# Patient Record
Sex: FEMALE | Race: WHITE | Employment: UNEMPLOYED | URBAN - METROPOLITAN AREA
[De-identification: names, ages, dates, MRNs, and addresses within clinical notes are randomized per-mention and may not be internally consistent; named-entity substitution may affect disease eponyms.]

---
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
## 2019-02-22 DIAGNOSIS — F41.1 GAD (GENERALIZED ANXIETY DISORDER): ICD-10-CM

## 2019-02-22 DIAGNOSIS — F32.1 MODERATE MAJOR DEPRESSION (H): ICD-10-CM

## 2019-02-22 RX ORDER — FLUOXETINE 10 MG/1
20 CAPSULE ORAL DAILY
Qty: 60 CAPSULE | Refills: 3 | Status: SHIPPED | OUTPATIENT
Start: 2019-02-22 | End: 2019-02-25

## 2019-02-22 NOTE — TELEPHONE ENCOUNTER
**This refill requires provider completion and is not appropriate for RN review per RN refill guidelines.**  PH-Q9 needs to be less than 5 to approve medication on RN Refill protocol pt's score is 9.  Angela Mcconnell RN

## 2019-02-22 NOTE — TELEPHONE ENCOUNTER
"Requested Prescriptions   Pending Prescriptions Disp Refills     FLUoxetine (PROZAC) 10 MG capsule 60 capsule 3     Sig: Take 2 capsules (20 mg) by mouth daily Start by taking 1 capsule 10 mg) daily for 1 week, then increase to 2 capsules (20 mg) daily    SSRIs Protocol Failed - 2/22/2019  8:42 AM       Failed - PHQ-9 score less than 5 in past 6 months    Please review last PHQ-9 score.          Passed - Medication is active on med list       Passed - Patient is age 18 or older       Passed - No active pregnancy on record       Passed - No positive pregnancy test in last 12 months       Passed - Recent (6 mo) or future (30 days) visit within the authorizing provider's specialty    Patient had office visit in the last 6 months or has a visit in the next 30 days with authorizing provider or within the authorizing provider's specialty.  See \"Patient Info\" tab in inbasket, or \"Choose Columns\" in Meds & Orders section of the refill encounter.            Last Written Prescription Date:  10/9/18  Last Fill Quantity: 60,  # refills: 3   Last office visit: 10/9/2018 with prescribing provider:  Mike   Future Office Visit:   Next 5 appointments (look out 90 days)    Feb 25, 2019  2:40 PM CST  SHORT with Ferny Vásquez MD  [facility] ([facility] [address] [place] [address] [phone]           "

## 2019-02-24 NOTE — PROGRESS NOTES
SUBJECTIVE:   Loni Neumann is a 18 year old female who presents to clinic today for the following health issues:    Loni was started on fluoxetine in September and reported improvement in October.  She has been following with [facility] for therapy.     Depression and Anxiety Follow-Up- would like to discuss increasing dose of Fluoxetine      Status since last visit: Worsened anxiety is worsening     Other associated symptoms:None    Complicating factors:     Significant life event: No- no particular stressors    Current substance abuse: Cannabis    PHQ 10/4/2018 10/9/2018 11/5/2018   PHQ-9 Total Score 15 15 9   Q9: Suicide Ideation Several days Several days Not at all     ZITA-7 SCORE 10/4/2018 10/9/2018 11/5/2018   Total Score 9 10 5     PHQ-9  English  PHQ-9   Any Language  ZITA-7  Suicide Assessment Five-step Evaluation and Treatment (SAFE-T)    Amount of exercise or physical activity: None    Problems taking medications regularly: No    Medication side effects: none    Diet: regular (no restrictions)      Problem list and histories reviewed & adjusted, as indicated.  Additional history: as documented    Patient Active Problem List   Diagnosis     Cannabis dependence (H)     Moderate major depression (H)     ZITA (generalized anxiety disorder)     Encounter for initial prescription of contraceptive pills     History reviewed. No pertinent surgical history.    Social History     Tobacco Use     Smoking status: Passive Smoke Exposure - Never Smoker     Smokeless tobacco: Never Used     Tobacco comment: car and outside   Substance Use Topics     Alcohol use: No     Alcohol/week: 0.0 oz     Family History   Problem Relation Age of Onset     Unknown/Adopted Father      Depression Father      Anxiety Disorder Father      Anxiety Disorder Brother      Attention Deficit Disorder Brother          Current Outpatient Medications   Medication Sig Dispense Refill     FLUoxetine (PROZAC) 40 MG capsule Take 1 capsule (40  "mg) by mouth daily 30 capsule 3     hydrocortisone (WESTCORT) 0.2 % cream Apply topically 2 times daily Apply to affected area 45 g 2     norgestrel-ethinyl estradiol (LO/OVRAL) 0.3-30 MG-MCG per tablet Take 1 tablet by mouth daily 84 tablet 3     Allergies   Allergen Reactions     Penicillins Hives       Reviewed and updated as needed this visit by clinical staff       Reviewed and updated as needed this visit by Provider           OBJECTIVE:     /64 (BP Location: Right arm, Patient Position: Chair, Cuff Size: Adult Regular)   Pulse 104   Temp 98.4  F (36.9  C) (Tympanic)   Resp 16   Ht 1.556 m (5' 1.25\")   Wt 48.6 kg (107 lb 3.2 oz)   LMP 02/02/2019 (Exact Date)   SpO2 98%   Breastfeeding? No   BMI 20.09 kg/m    Body mass index is 20.09 kg/m .  GENERAL: healthy, alert and no distress  PSYCH: mentation appears normal, affect normal/bright      ASSESSMENT/PLAN:       1. Moderate major depression (H)  2. ZITA (generalized anxiety disorder)    Loni reports worsening anxiety symptoms, no clear provoking factors for this.  She is tolerating the fluoxetine well, so we'll increase this from 20mg to 40mg.  She is going to follow-up with therapy.  Follow-up with me or PCP in 2 months for reassessment or earlier if needed.      - FLUoxetine (PROZAC) 40 MG capsule; Take 1 capsule (40 mg) by mouth daily  Dispense: 30 capsule; Refill: 3      Ferny Vásquez MD  [facility]   "

## 2019-02-25 ENCOUNTER — OFFICE VISIT (OUTPATIENT)
Dept: FAMILY MEDICINE | Facility: CLINIC | Age: 19
End: 2019-02-25
Payer: COMMERCIAL

## 2019-02-25 VITALS
HEIGHT: 61 IN | HEART RATE: 104 BPM | BODY MASS INDEX: 20.24 KG/M2 | DIASTOLIC BLOOD PRESSURE: 64 MMHG | TEMPERATURE: 98.4 F | OXYGEN SATURATION: 98 % | SYSTOLIC BLOOD PRESSURE: 104 MMHG | WEIGHT: 107.2 LBS | RESPIRATION RATE: 16 BRPM

## 2019-02-25 DIAGNOSIS — F41.1 GAD (GENERALIZED ANXIETY DISORDER): ICD-10-CM

## 2019-02-25 DIAGNOSIS — F32.1 MODERATE MAJOR DEPRESSION (H): Primary | ICD-10-CM

## 2019-02-25 PROCEDURE — 99213 OFFICE O/P EST LOW 20 MIN: CPT | Performed by: INTERNAL MEDICINE

## 2019-02-25 RX ORDER — FLUOXETINE 40 MG/1
40 CAPSULE ORAL DAILY
Qty: 30 CAPSULE | Refills: 3 | Status: SHIPPED | OUTPATIENT
Start: 2019-02-25 | End: 2019-06-22

## 2019-02-25 ASSESSMENT — ANXIETY QUESTIONNAIRES
3. WORRYING TOO MUCH ABOUT DIFFERENT THINGS: SEVERAL DAYS
GAD7 TOTAL SCORE: 10
2. NOT BEING ABLE TO STOP OR CONTROL WORRYING: SEVERAL DAYS
7. FEELING AFRAID AS IF SOMETHING AWFUL MIGHT HAPPEN: SEVERAL DAYS
5. BEING SO RESTLESS THAT IT IS HARD TO SIT STILL: MORE THAN HALF THE DAYS
IF YOU CHECKED OFF ANY PROBLEMS ON THIS QUESTIONNAIRE, HOW DIFFICULT HAVE THESE PROBLEMS MADE IT FOR YOU TO DO YOUR WORK, TAKE CARE OF THINGS AT HOME, OR GET ALONG WITH OTHER PEOPLE: VERY DIFFICULT
6. BECOMING EASILY ANNOYED OR IRRITABLE: MORE THAN HALF THE DAYS
1. FEELING NERVOUS, ANXIOUS, OR ON EDGE: SEVERAL DAYS

## 2019-02-25 ASSESSMENT — PATIENT HEALTH QUESTIONNAIRE - PHQ9
5. POOR APPETITE OR OVEREATING: MORE THAN HALF THE DAYS
SUM OF ALL RESPONSES TO PHQ QUESTIONS 1-9: 4

## 2019-02-25 ASSESSMENT — MIFFLIN-ST. JEOR: SCORE: 1202.6

## 2019-02-25 NOTE — NURSING NOTE
"Initial /64 (BP Location: Right arm, Patient Position: Chair, Cuff Size: Adult Regular)   Pulse 104   Temp 98.4  F (36.9  C) (Tympanic)   Resp 16   Ht 1.556 m (5' 1.25\")   Wt 48.6 kg (107 lb 3.2 oz)   SpO2 98%   BMI 20.09 kg/m   Estimated body mass index is 20.09 kg/m  as calculated from the following:    Height as of this encounter: 1.556 m (5' 1.25\").    Weight as of this encounter: 48.6 kg (107 lb 3.2 oz). .    Tammy Tejeda CMA ([facility]) 2/25/2019 2:49 PM       "

## 2019-02-26 ASSESSMENT — ANXIETY QUESTIONNAIRES: GAD7 TOTAL SCORE: 10

## 2019-06-22 DIAGNOSIS — F32.1 MODERATE MAJOR DEPRESSION (H): ICD-10-CM

## 2019-06-22 DIAGNOSIS — F41.1 GAD (GENERALIZED ANXIETY DISORDER): ICD-10-CM

## 2019-06-24 RX ORDER — FLUOXETINE 40 MG/1
40 CAPSULE ORAL DAILY
Qty: 30 CAPSULE | Refills: 0 | Status: SHIPPED | OUTPATIENT
Start: 2019-06-24 | End: 2019-07-26

## 2019-06-24 NOTE — TELEPHONE ENCOUNTER
"Requested Prescriptions   Pending Prescriptions Disp Refills     FLUoxetine (PROZAC) 40 MG capsule [Pharmacy Med Name: FLUOXETINE HCL 40 MG CAPSULE] 30 capsule 3     Sig: Take 1 capsule (40 mg) by mouth daily   Last Written Prescription Date:  2/25/19  Last Fill Quantity: 30 cap,  # refills: 3   Last office visit: 2/25/2019 with prescribing provider:  EVANGELIST Vásquez   Future Office Visit:        SSRIs Protocol Passed - 6/22/2019  8:00 AM        Passed - PHQ-9 score less than 5 in past 6 months     Please review last PHQ-9 score.           Passed - Medication is active on med list        Passed - Patient is age 18 or older        Passed - No active pregnancy on record        Passed - No positive pregnancy test in last 12 months        Passed - Recent (6 mo) or future (30 days) visit within the authorizing provider's specialty     Patient had office visit in the last 6 months or has a visit in the next 30 days with authorizing provider or within the authorizing provider's specialty.  See \"Patient Info\" tab in inbasket, or \"Choose Columns\" in Meds & Orders section of the refill encounter.              "

## 2019-06-24 NOTE — TELEPHONE ENCOUNTER
Medication is being filled for 1 time refill only due to:  :  Pt was due to come in around 4/25/19 for Routine Visit.    PHQ-9 SCORE 10/9/2018 11/5/2018 2/25/2019   PHQ-9 Total Score 15 9 4     ZITA-7 SCORE 10/9/2018 11/5/2018 2/25/2019   Total Score 10 5 10     Laura KULKARNI RN       Was the patient seen in the last year in this department? Yes     Does patient have an active prescription for medications requested? No     Received Request Via: Pharmacy

## 2019-07-26 DIAGNOSIS — F32.1 MODERATE MAJOR DEPRESSION (H): ICD-10-CM

## 2019-07-26 DIAGNOSIS — F41.1 GAD (GENERALIZED ANXIETY DISORDER): ICD-10-CM

## 2019-07-29 RX ORDER — FLUOXETINE 40 MG/1
40 CAPSULE ORAL DAILY
Qty: 30 CAPSULE | Refills: 0 | Status: SHIPPED | OUTPATIENT
Start: 2019-07-29 | End: 2019-08-26

## 2019-07-29 NOTE — TELEPHONE ENCOUNTER
"Requested Prescriptions   Pending Prescriptions Disp Refills     FLUoxetine (PROZAC) 40 MG capsule [Pharmacy Med Name: FLUOXETINE HCL 40 MG CAPSULE] 30 capsule 0     Sig: Take 1 capsule (40 mg) by mouth daily       SSRIs Protocol Passed - 7/26/2019  2:55 PM        Passed - PHQ-9 score less than 5 in past 6 months     Please review last PHQ-9 score.     PHQ-9 SCORE 10/9/2018 11/5/2018 2/25/2019   PHQ-9 Total Score 15 9 4     ZITA-7 SCORE 10/9/2018 11/5/2018 2/25/2019   Total Score 10 5 10                 Passed - Medication is active on med list        Passed - Patient is age 18 or older        Passed - No active pregnancy on record        Passed - No positive pregnancy test in last 12 months        Passed - Recent (6 mo) or future (30 days) visit within the authorizing provider's specialty     Patient had office visit in the last 6 months or has a visit in the next 30 days with authorizing provider or within the authorizing provider's specialty.  See \"Patient Info\" tab in inbasket, or \"Choose Columns\" in Meds & Orders section of the refill encounter.      Last Written Prescription Date:  6/26/19  Last Fill Quantity: 30,  # refills: 0   Last office visit: 2/25/2019 with prescribing provider:     Future Office Visit:                "

## 2019-07-29 NOTE — TELEPHONE ENCOUNTER
S:  Refill request for fluoxetine    B:  LOV 2/25/19  Fluoxetine last written 6/24/19 for 30 day supply    A:  Passes [facility] refill protocol    R:  Filled per [facility] refill protocol    No further action necessary.  Encounter closed.    Iam Mcneill RN

## 2019-08-12 DIAGNOSIS — Z30.011 ENCOUNTER FOR INITIAL PRESCRIPTION OF CONTRACEPTIVE PILLS: ICD-10-CM

## 2019-08-12 NOTE — TELEPHONE ENCOUNTER
"Requested Prescriptions   Pending Prescriptions Disp Refills     ELINEST 0.3-30 MG-MCG tablet [Pharmacy Med Name: ELINEST 0.3-0.03MG TABLET] 84 tablet 3     Sig: TAKE ONE TABLET BY MOUTH EVERY DAY   Last Written Prescription Date:  6/21/18  Last Fill Quantity: 84 tab,  # refills: 3   Last office visit: 2/25/2019 with prescribing provider:  EVANGELIST Vásquez   Future Office Visit:        Contraceptives Protocol Passed - 8/12/2019  1:31 PM        Passed - Patient is not a current smoker if age is 35 or older        Passed - Recent (12 mo) or future (30 days) visit within the authorizing provider's specialty     Patient had office visit in the last 12 months or has a visit in the next 30 days with authorizing provider or within the authorizing provider's specialty.  See \"Patient Info\" tab in inbasket, or \"Choose Columns\" in Meds & Orders section of the refill encounter.              Passed - Medication is active on med list        Passed - No active pregnancy on record        Passed - No positive pregnancy test in past 12 months          "

## 2019-08-14 RX ORDER — NORGESTREL AND ETHINYL ESTRADIOL 0.3-0.03MG
KIT ORAL
Qty: 84 TABLET | Refills: 1 | Status: SHIPPED | OUTPATIENT
Start: 2019-08-14 | End: 2019-08-26

## 2019-08-26 ENCOUNTER — OFFICE VISIT (OUTPATIENT)
Dept: FAMILY MEDICINE | Facility: CLINIC | Age: 19
End: 2019-08-26
Payer: COMMERCIAL

## 2019-08-26 VITALS
HEART RATE: 77 BPM | TEMPERATURE: 98.8 F | BODY MASS INDEX: 23.3 KG/M2 | WEIGHT: 123.4 LBS | SYSTOLIC BLOOD PRESSURE: 100 MMHG | HEIGHT: 61 IN | OXYGEN SATURATION: 98 % | DIASTOLIC BLOOD PRESSURE: 62 MMHG | RESPIRATION RATE: 16 BRPM

## 2019-08-26 DIAGNOSIS — F12.20 CANNABIS DEPENDENCE (H): ICD-10-CM

## 2019-08-26 DIAGNOSIS — Z30.011 ENCOUNTER FOR INITIAL PRESCRIPTION OF CONTRACEPTIVE PILLS: ICD-10-CM

## 2019-08-26 DIAGNOSIS — F32.1 MODERATE MAJOR DEPRESSION (H): ICD-10-CM

## 2019-08-26 DIAGNOSIS — Z11.3 SCREEN FOR STD (SEXUALLY TRANSMITTED DISEASE): Primary | ICD-10-CM

## 2019-08-26 DIAGNOSIS — F41.1 GAD (GENERALIZED ANXIETY DISORDER): ICD-10-CM

## 2019-08-26 PROCEDURE — 87591 N.GONORRHOEAE DNA AMP PROB: CPT | Performed by: NURSE PRACTITIONER

## 2019-08-26 PROCEDURE — 87491 CHLMYD TRACH DNA AMP PROBE: CPT | Performed by: NURSE PRACTITIONER

## 2019-08-26 PROCEDURE — 99213 OFFICE O/P EST LOW 20 MIN: CPT | Performed by: NURSE PRACTITIONER

## 2019-08-26 RX ORDER — FLUOXETINE 40 MG/1
40 CAPSULE ORAL DAILY
Qty: 90 CAPSULE | Refills: 3 | Status: SHIPPED | OUTPATIENT
Start: 2019-08-26 | End: 2019-11-11 | Stop reason: DRUGHIGH

## 2019-08-26 ASSESSMENT — PATIENT HEALTH QUESTIONNAIRE - PHQ9
5. POOR APPETITE OR OVEREATING: NOT AT ALL
SUM OF ALL RESPONSES TO PHQ QUESTIONS 1-9: 2

## 2019-08-26 ASSESSMENT — MIFFLIN-ST. JEOR: SCORE: 1267.12

## 2019-08-26 ASSESSMENT — ANXIETY QUESTIONNAIRES
3. WORRYING TOO MUCH ABOUT DIFFERENT THINGS: NOT AT ALL
7. FEELING AFRAID AS IF SOMETHING AWFUL MIGHT HAPPEN: NOT AT ALL
GAD7 TOTAL SCORE: 3
1. FEELING NERVOUS, ANXIOUS, OR ON EDGE: SEVERAL DAYS
5. BEING SO RESTLESS THAT IT IS HARD TO SIT STILL: SEVERAL DAYS
IF YOU CHECKED OFF ANY PROBLEMS ON THIS QUESTIONNAIRE, HOW DIFFICULT HAVE THESE PROBLEMS MADE IT FOR YOU TO DO YOUR WORK, TAKE CARE OF THINGS AT HOME, OR GET ALONG WITH OTHER PEOPLE: SOMEWHAT DIFFICULT
6. BECOMING EASILY ANNOYED OR IRRITABLE: SEVERAL DAYS
2. NOT BEING ABLE TO STOP OR CONTROL WORRYING: NOT AT ALL

## 2019-08-26 NOTE — PATIENT INSTRUCTIONS
PREVENTIVE HEALTH RECOMMENDATIONS    Colonoscopy every 10 years after age 50 unless polyps or  family history then  earlier and more often.     Pap smears for 3 consecutive years, if normal can go every 3 years after the age of 30 depending on risk factors but  continue pelvic exams yearly with check ups.    Mammogram baseline at 40 with frequency after that dependent on findings and risks but yearly after 50     Bone density testing (DEXA scan) every 3 years after age 65    Exercise 30 min daily most days of the week.    Keep weight below BMI of 30.  Ideal weight < 26 BMI.  Your BMI today was Body mass index is 23.32 kg/m ..    Calcium citrate 1200 mg a day plus 800 IU of Vitamin D  Daily.    Avoid more than one alcoholic beverage per occasion    Cholesterol screening every 5 years unless on medication then minimally every year.    Keep blood pressure below 140/90.  Your blood pressure today was   BP Readings from Last 6 Encounters:   08/26/19 100/62   02/25/19 104/64   10/09/18 94/70   09/13/18 110/80   08/22/18 118/62   06/21/18 106/67       Kegel exercises to prevent pelvic floor relaxation and leaking of urine    Immunizations:  Tetanus  every 10 years, Shingles vaccine (zostavax if desired after 60), Pneumonia vaccine after age 65,  yearly influenza vaccination if desired or history of respiratory problems.    PRIMITIVO Hackett

## 2019-08-26 NOTE — PROGRESS NOTES
Subjective     Loin Neumann is a 19 year old female who presents to clinic today for the following health issues:    HPI   Depression and Anxiety Follow-Up    How are you doing with your depression since your last visit? Improved     How are you doing with your anxiety since your last visit?  No change    Are you having other symptoms that might be associated with depression or anxiety? No    Have you had a significant life event? Grief or Loss     Do you have any concerns with your use of alcohol or other drugs? No    Social History     Tobacco Use     Smoking status: Passive Smoke Exposure - Never Smoker     Smokeless tobacco: Never Used     Tobacco comment: car and outside   Substance Use Topics     Alcohol use: No     Alcohol/week: 0.0 oz     Drug use: Yes     Types: Marijuana     Comment: 2-3 times per month     PHQ 11/5/2018 2/25/2019 8/26/2019   PHQ-9 Total Score 9 4 2   Q9: Thoughts of better off dead/self-harm past 2 weeks Not at all Not at all Not at all     ZITA-7 SCORE 11/5/2018 2/25/2019 8/26/2019   Total Score 5 10 3     No flowsheet data found.  No flowsheet data found.  In the past two weeks have you had thoughts of suicide or self-harm?  No.    Do you have concerns about your personal safety or the safety of others?   No    Suicide Assessment Five-step Evaluation and Treatment (SAFE-T)      How many servings of fruits and vegetables do you eat daily?  0-1    On average, how many sweetened beverages do you drink each day (soda, juice, sweet tea, etc)?   4    How many days per week do you miss taking your medication? 0        Also taking oral contraceptive pills - taking daily.  LMP 8/7/2019 - regular period.  No side effects or concerns taking medications.        Patient Active Problem List   Diagnosis     Cannabis dependence (H)     Moderate major depression (H)     ZITA (generalized anxiety disorder)     Encounter for initial prescription of contraceptive pills     History reviewed. No pertinent  "surgical history.    Social History     Tobacco Use     Smoking status: Passive Smoke Exposure - Never Smoker     Smokeless tobacco: Never Used     Tobacco comment: car and outside   Substance Use Topics     Alcohol use: No     Alcohol/week: 0.0 oz     Family History   Problem Relation Age of Onset     Unknown/Adopted Father      Depression Father      Anxiety Disorder Father      Anxiety Disorder Brother      Attention Deficit Disorder Brother          Current Outpatient Medications   Medication Sig Dispense Refill     FLUoxetine (PROZAC) 40 MG capsule Take 1 capsule (40 mg) by mouth daily 90 capsule 3     hydrocortisone (WESTCORT) 0.2 % cream Apply topically 2 times daily Apply to affected area 45 g 2     norgestrel-ethinyl estradiol (ELINEST) 0.3-30 MG-MCG tablet Take 1 tablet by mouth daily 84 tablet 3     Allergies   Allergen Reactions     Penicillins Hives     No lab results found.   BP Readings from Last 3 Encounters:   08/26/19 100/62   02/25/19 104/64   10/09/18 94/70    Wt Readings from Last 3 Encounters:   08/26/19 56 kg (123 lb 6.4 oz)   02/25/19 48.6 kg (107 lb 3.2 oz) (11 %)*   10/09/18 45.4 kg (100 lb 1.6 oz) (4 %)*     * Growth percentiles are based on CDC (Girls, 2-20 Years) data.                      Reviewed and updated as needed this visit by Provider  Tobacco  Allergies  Meds  Problems  Med Hx  Soc Hx        Review of Systems   ROS COMP: Constitutional, HEENT, cardiovascular, pulmonary, GI, , musculoskeletal, neuro, skin, endocrine and psych systems are negative, except as otherwise noted.      Objective    /62   Pulse 77   Temp 98.8  F (37.1  C) (Tympanic)   Resp 16   Ht 1.549 m (5' 1\")   Wt 56 kg (123 lb 6.4 oz)   LMP 08/07/2019 (Exact Date)   SpO2 98%   BMI 23.32 kg/m    Body mass index is 23.32 kg/m .  Physical Exam   GENERAL: healthy, alert and no distress  NECK: no adenopathy, no asymmetry, masses, or scars and thyroid normal to palpation  RESP: lungs clear to " auscultation - no rales, rhonchi or wheezes  CV: regular rate and rhythm, normal S1 S2, no S3 or S4, no murmur, click or rub, no peripheral edema and peripheral pulses strong  ABDOMEN: soft, nontender, no hepatosplenomegaly, no masses and bowel sounds normal  MS: no gross musculoskeletal defects noted, no edema  PSYCH: mentation appears normal, affect normal/bright    Diagnostic Test Results:  Labs reviewed in Epic  Gonorrhea/chlamydia testing        Assessment & Plan     1. Encounter for initial prescription of contraceptive pills   The risks, benefits and treatment options of prescribed medications or other treatments have been discussed with the patient. The patient verbalized their understanding and should call or follow up if no improvement or if they develop further problems.    - norgestrel-ethinyl estradiol (ELINEST) 0.3-30 MG-MCG tablet; Take 1 tablet by mouth daily  Dispense: 84 tablet; Refill: 3    2. Moderate major depression (H)   stable - doing well.    - FLUoxetine (PROZAC) 40 MG capsule; Take 1 capsule (40 mg) by mouth daily  Dispense: 90 capsule; Refill: 3    3. ZITA (generalized anxiety disorder)     - FLUoxetine (PROZAC) 40 MG capsule; Take 1 capsule (40 mg) by mouth daily  Dispense: 90 capsule; Refill: 3    4. Screen for STD (sexually transmitted disease)     - NEISSERIA GONORRHOEA PCR  - CHLAMYDIA TRACHOMATIS PCR       Patient Instructions     PREVENTIVE HEALTH RECOMMENDATIONS    Colonoscopy every 10 years after age 50 unless polyps or  family history then  earlier and more often.     Pap smears for 3 consecutive years, if normal can go every 3 years after the age of 30 depending on risk factors but  continue pelvic exams yearly with check ups.    Mammogram baseline at 40 with frequency after that dependent on findings and risks but yearly after 50     Bone density testing (DEXA scan) every 3 years after age 65    Exercise 30 min daily most days of the week.    Keep weight below BMI of 30.  Ideal  weight < 26 BMI.  Your BMI today was Body mass index is 23.32 kg/m ..    Calcium citrate 1200 mg a day plus 800 IU of Vitamin D  Daily.    Avoid more than one alcoholic beverage per occasion    Cholesterol screening every 5 years unless on medication then minimally every year.    Keep blood pressure below 140/90.  Your blood pressure today was   BP Readings from Last 6 Encounters:   08/26/19 100/62   02/25/19 104/64   10/09/18 94/70   09/13/18 110/80   08/22/18 118/62   06/21/18 106/67       Kegel exercises to prevent pelvic floor relaxation and leaking of urine    Immunizations:  Tetanus  every 10 years, Shingles vaccine (zostavax if desired after 60), Pneumonia vaccine after age 65,  yearly influenza vaccination if desired or history of respiratory problems.    PRIMITIVO Hackett          Return in about 1 year (around 8/26/2020) for Physical Exam.    Kavya Mckeon NP  [facility] [place]

## 2019-08-27 ASSESSMENT — ANXIETY QUESTIONNAIRES: GAD7 TOTAL SCORE: 3

## 2019-11-03 ENCOUNTER — HEALTH MAINTENANCE LETTER (OUTPATIENT)
Age: 19
End: 2019-11-03

## 2019-11-11 ENCOUNTER — OFFICE VISIT (OUTPATIENT)
Dept: FAMILY MEDICINE | Facility: CLINIC | Age: 19
End: 2019-11-11
Payer: COMMERCIAL

## 2019-11-11 VITALS
OXYGEN SATURATION: 98 % | WEIGHT: 130.1 LBS | TEMPERATURE: 97.7 F | HEART RATE: 93 BPM | BODY MASS INDEX: 24.56 KG/M2 | HEIGHT: 61 IN | DIASTOLIC BLOOD PRESSURE: 70 MMHG | RESPIRATION RATE: 14 BRPM | SYSTOLIC BLOOD PRESSURE: 106 MMHG

## 2019-11-11 DIAGNOSIS — F32.1 MODERATE MAJOR DEPRESSION (H): ICD-10-CM

## 2019-11-11 DIAGNOSIS — F41.1 GAD (GENERALIZED ANXIETY DISORDER): ICD-10-CM

## 2019-11-11 PROCEDURE — 99214 OFFICE O/P EST MOD 30 MIN: CPT | Performed by: NURSE PRACTITIONER

## 2019-11-11 RX ORDER — FLUOXETINE 40 MG/1
40 CAPSULE ORAL DAILY
Qty: 90 CAPSULE | Refills: 3 | Status: CANCELLED | OUTPATIENT
Start: 2019-11-11

## 2019-11-11 ASSESSMENT — ANXIETY QUESTIONNAIRES
3. WORRYING TOO MUCH ABOUT DIFFERENT THINGS: SEVERAL DAYS
5. BEING SO RESTLESS THAT IT IS HARD TO SIT STILL: MORE THAN HALF THE DAYS
GAD7 TOTAL SCORE: 4
7. FEELING AFRAID AS IF SOMETHING AWFUL MIGHT HAPPEN: NOT AT ALL
1. FEELING NERVOUS, ANXIOUS, OR ON EDGE: SEVERAL DAYS
6. BECOMING EASILY ANNOYED OR IRRITABLE: NOT AT ALL
IF YOU CHECKED OFF ANY PROBLEMS ON THIS QUESTIONNAIRE, HOW DIFFICULT HAVE THESE PROBLEMS MADE IT FOR YOU TO DO YOUR WORK, TAKE CARE OF THINGS AT HOME, OR GET ALONG WITH OTHER PEOPLE: SOMEWHAT DIFFICULT
2. NOT BEING ABLE TO STOP OR CONTROL WORRYING: NOT AT ALL

## 2019-11-11 ASSESSMENT — PATIENT HEALTH QUESTIONNAIRE - PHQ9
5. POOR APPETITE OR OVEREATING: NOT AT ALL
SUM OF ALL RESPONSES TO PHQ QUESTIONS 1-9: 12

## 2019-11-11 ASSESSMENT — MIFFLIN-ST. JEOR: SCORE: 1297.51

## 2019-11-11 NOTE — NURSING NOTE
"Initial /70 (BP Location: Right arm, Patient Position: Sitting, Cuff Size: Adult Regular)   Pulse 93   Temp 97.7  F (36.5  C) (Tympanic)   Resp 14   Ht 1.549 m (5' 1\")   Wt 59 kg (130 lb 1.6 oz)   SpO2 98%   BMI 24.58 kg/m   Estimated body mass index is 24.58 kg/m  as calculated from the following:    Height as of this encounter: 1.549 m (5' 1\").    Weight as of this encounter: 59 kg (130 lb 1.6 oz). .    Brittany Ferguson on 11/11/2019 at 2:40 PM    "

## 2019-11-11 NOTE — PATIENT INSTRUCTIONS
Increase Fluoxetine to 60 mg - check in at 1 month to make sure symptoms have improved.  If they have then we will check in again at 3-4 months and reassess if we can drop back down to 40 mg again.    Follow-up with [facility] in the next 1-2 weeks and restart counseling for grief and anxiety/depressive symptoms.    PRIMITIVO Hackett

## 2019-11-11 NOTE — PROGRESS NOTES
Subjective     Loni Neumann is a 19 year old female who presents to clinic today for the following health issues:    HPI   Depression and Anxiety Follow-Up    How are you doing with your depression since your last visit? Worsened - pt states it may be due to weather changes.     How are you doing with your anxiety since your last visit?  Worsened.     Are you having other symptoms that might be associated with depression or anxiety? No    Have you had a significant life event? No     Do you have any concerns with your use of alcohol or other drugs? No     Patient would like to discuss her current dose of medication.     Social History     Tobacco Use     Smoking status: Current Some Day Smoker     Packs/day: 0.00     Smokeless tobacco: Never Used     Tobacco comment: E-cig   Substance Use Topics     Alcohol use: No     Alcohol/week: 0.0 standard drinks     Drug use: Yes     Types: Marijuana     Comment: 2-3 times per month     PHQ 2/25/2019 8/26/2019 11/11/2019   PHQ-9 Total Score 4 2 12   Q9: Thoughts of better off dead/self-harm past 2 weeks Not at all Not at all Not at all     ZITA-7 SCORE 2/25/2019 8/26/2019 11/11/2019   Total Score 10 3 4     Last PHQ-9 11/11/2019   1.  Little interest or pleasure in doing things 2   2.  Feeling down, depressed, or hopeless 1   3.  Trouble falling or staying asleep, or sleeping too much 2   4.  Feeling tired or having little energy 3   5.  Poor appetite or overeating 0   6.  Feeling bad about yourself 1   7.  Trouble concentrating 1   8.  Moving slowly or restless 2   Q9: Thoughts of better off dead/self-harm past 2 weeks 0   PHQ-9 Total Score 12   Difficulty at work, home, or with people Very difficult     ZITA-7  11/11/2019   1. Feeling nervous, anxious, or on edge 1   2. Not being able to stop or control worrying 0   3. Worrying too much about different things 1   4. Trouble relaxing 0   5. Being so restless that it is hard to sit still 2   6. Becoming easily annoyed or  irritable 0   7. Feeling afraid, as if something awful might happen 0   ZITA-7 Total Score 4   If you checked any problems, how difficult have they made it for you to do your work, take care of things at home, or get along with other people? Somewhat difficult     In the past two weeks have you had thoughts of suicide or self-harm?  No.    Do you have concerns about your personal safety or the safety of others?   No    Suicide Assessment Five-step Evaluation and Treatment (SAFE-T)      How many servings of fruits and vegetables do you eat daily?  2-3    On average, how many sweetened beverages do you drink each day (soda, juice, sweet tea, etc)?   4 - Juice.     How many days per week do you miss taking your medication? 0      Patient Active Problem List   Diagnosis     Cannabis dependence (H)     Moderate major depression (H)     ZITA (generalized anxiety disorder)     Encounter for initial prescription of contraceptive pills     History reviewed. No pertinent surgical history.    Social History     Tobacco Use     Smoking status: Current Some Day Smoker     Packs/day: 0.00     Smokeless tobacco: Never Used     Tobacco comment: E-cig   Substance Use Topics     Alcohol use: No     Alcohol/week: 0.0 standard drinks     Family History   Problem Relation Age of Onset     Unknown/Adopted Father      Depression Father      Anxiety Disorder Father      Anxiety Disorder Brother      Attention Deficit Disorder Brother          Current Outpatient Medications   Medication Sig Dispense Refill     FLUoxetine (PROZAC) 20 MG capsule Take 3 capsules (60 mg) by mouth daily 90 capsule 6     hydrocortisone (WESTCORT) 0.2 % cream Apply topically 2 times daily Apply to affected area 45 g 2     norgestrel-ethinyl estradiol (ELINEST) 0.3-30 MG-MCG tablet Take 1 tablet by mouth daily 84 tablet 3     Allergies   Allergen Reactions     Penicillins Hives     No lab results found.   BP Readings from Last 3 Encounters:   11/11/19 106/70  "  08/26/19 100/62   02/25/19 104/64    Wt Readings from Last 3 Encounters:   11/11/19 59 kg (130 lb 1.6 oz)   08/26/19 56 kg (123 lb 6.4 oz)   02/25/19 48.6 kg (107 lb 3.2 oz) (11 %)*     * Growth percentiles are based on [facility] (Girls, 2-20 Years) data.                    Reviewed and updated as needed this visit by Provider  Tobacco  Med Hx  Soc Hx        Review of Systems   ROS COMP: Constitutional, HEENT, cardiovascular, pulmonary, GI, , musculoskeletal, neuro, skin, endocrine and psych systems are negative, except as otherwise noted.      Objective    /70 (BP Location: Right arm, Patient Position: Sitting, Cuff Size: Adult Regular)   Pulse 93   Temp 97.7  F (36.5  C) (Tympanic)   Resp 14   Ht 1.549 m (5' 1\")   Wt 59 kg (130 lb 1.6 oz)   SpO2 98%   BMI 24.58 kg/m    Body mass index is 24.58 kg/m .  Physical Exam   GENERAL: healthy, alert and no distress  PSYCH: mentation appears normal, affect flat, fatigued and judgement and insight intact    Diagnostic Test Results:  Labs reviewed in Epic        Assessment & Plan     1. Moderate major depression (H)   The risks, benefits and treatment options of prescribed medications or other treatments have been discussed with the patient. The patient verbalized their understanding and should call or follow up if no improvement or if they develop further problems.  Discussed counseling and grief groups.  No SI today.    - FLUoxetine (PROZAC) 20 MG capsule; Take 3 capsules (60 mg) by mouth daily  Dispense: 90 capsule; Refill: 6    2. ZITA (generalized anxiety disorder)     - FLUoxetine (PROZAC) 20 MG capsule; Take 3 capsules (60 mg) by mouth daily  Dispense: 90 capsule; Refill: 6     Tobacco Cessation:   reports that she has been smoking. She has been smoking about 0.00 packs per day. She has never used smokeless tobacco.          Patient Instructions   Increase Fluoxetine to 60 mg - check in at 1 month to make sure symptoms have improved.  If they have then we " will check in again at 3-4 months and reassess if we can drop back down to 40 mg again.    Follow-up with [facility] in the next 1-2 weeks and restart counseling for grief and anxiety/depressive symptoms.    PRIMITIVO Hackett              No follow-ups on file.    Kavya Mckeon NP  [facility]

## 2019-11-12 ASSESSMENT — ANXIETY QUESTIONNAIRES: GAD7 TOTAL SCORE: 4

## 2019-11-14 ENCOUNTER — OFFICE VISIT (OUTPATIENT)
Dept: BEHAVIORAL HEALTH | Facility: CLINIC | Age: 19
End: 2019-11-14
Payer: COMMERCIAL

## 2019-11-14 DIAGNOSIS — F32.1 MODERATE MAJOR DEPRESSION (H): Primary | ICD-10-CM

## 2019-11-14 DIAGNOSIS — F41.1 GAD (GENERALIZED ANXIETY DISORDER): ICD-10-CM

## 2019-11-14 PROCEDURE — 90832 PSYTX W PT 30 MINUTES: CPT | Performed by: SOCIAL WORKER

## 2019-11-21 ENCOUNTER — OFFICE VISIT (OUTPATIENT)
Dept: BEHAVIORAL HEALTH | Facility: CLINIC | Age: 19
End: 2019-11-21
Payer: COMMERCIAL

## 2019-11-21 DIAGNOSIS — F32.1 MODERATE MAJOR DEPRESSION (H): Primary | ICD-10-CM

## 2019-11-21 DIAGNOSIS — F41.1 GAD (GENERALIZED ANXIETY DISORDER): ICD-10-CM

## 2019-11-21 PROCEDURE — 90834 PSYTX W PT 45 MINUTES: CPT | Performed by: SOCIAL WORKER

## 2019-11-21 ASSESSMENT — ANXIETY QUESTIONNAIRES
7. FEELING AFRAID AS IF SOMETHING AWFUL MIGHT HAPPEN: NOT AT ALL
6. BECOMING EASILY ANNOYED OR IRRITABLE: SEVERAL DAYS
2. NOT BEING ABLE TO STOP OR CONTROL WORRYING: SEVERAL DAYS
3. WORRYING TOO MUCH ABOUT DIFFERENT THINGS: NOT AT ALL
IF YOU CHECKED OFF ANY PROBLEMS ON THIS QUESTIONNAIRE, HOW DIFFICULT HAVE THESE PROBLEMS MADE IT FOR YOU TO DO YOUR WORK, TAKE CARE OF THINGS AT HOME, OR GET ALONG WITH OTHER PEOPLE: SOMEWHAT DIFFICULT
GAD7 TOTAL SCORE: 5
5. BEING SO RESTLESS THAT IT IS HARD TO SIT STILL: MORE THAN HALF THE DAYS
1. FEELING NERVOUS, ANXIOUS, OR ON EDGE: SEVERAL DAYS

## 2019-11-21 ASSESSMENT — PATIENT HEALTH QUESTIONNAIRE - PHQ9
SUM OF ALL RESPONSES TO PHQ QUESTIONS 1-9: 9
5. POOR APPETITE OR OVEREATING: NOT AT ALL

## 2019-11-22 ASSESSMENT — ANXIETY QUESTIONNAIRES: GAD7 TOTAL SCORE: 5

## 2019-11-25 NOTE — PROGRESS NOTES
[facility]  November 14, 2019      Behavioral Health Clinician Progress Note    Patient Name: Loni Neumann           Service Type: Individual      Service Location:  in clinic      Session Start Time: 205 PM  session End Time:235 PM      Session Length: 16 - 37      Attendees: Patient    Visit Activities (Refresh list every visit): [facility] [place] Only    Diagnostic Assessment Date: 5/14/2018  Treatment Plan Review Date: 10-  See Flowsheets for today's PHQ-9 and ZITA-7 results  Previous PHQ-9:   PHQ-9 SCORE 8/26/2019 11/11/2019 11/21/2019   PHQ-9 Total Score 2 12 9     Previous ZITA-7:   ZITA-7 SCORE 8/26/2019 11/11/2019 11/21/2019   Total Score 3 4 5       DON LEVEL:  DON Score (Last Two) 5/14/2018   DON Raw Score 27   Activation Score 47.4   DON Level 2       DATA  Extended Session (60+ minutes): No  Interactive Complexity: No  Crisis: No    Treatment Objective(s) Addressed in This Session:  Target Behavior(s): disease management/lifestyle changes Decrease depression and anxiety and Decrease/discontinue cannabis use    Depressed Mood: Increase interest, engagement, and pleasure in doing things  Decrease frequency and intensity of feeling down, depressed, hopeless  Improve quantity and quality of night time sleep / decrease daytime naps  Feel less tired and more energy during the day   Improve diet, appetite, mindful eating, and / or meal planning  Identify negative self-talk and behaviors: challenge core beliefs, myths, and actions  Improve concentration, focus, and mindfulness in daily activities   Feel less fidgety, restless or slow in daily activities / interpersonal interactions  Decrease thoughts that you'd be better off dead or of suicide / self-harm  Anxiety: will experience a reduction in anxiety, will develop more effective coping skills to manage anxiety symptoms, will develop healthy cognitive patterns and beliefs and will increase ability to function adaptively  Functional  Impairment: will effectively address problems that interfere with adaptive functioning  Alcohol / Substance Use: will increase understanding of the effects of substance use  will make healthier choices in regard to substance use  will discuss/consider potential need for formal substance use evaluation, treatment and/or support  continue to make healthy choices regarding substance use and engage in activities / supportive services that promote sobriety    Current Stressors / Issues:  Patient reports depression and anxiety have increased due to recent suicide of best friend since childhood.  Patient reports feeling guilt that she had stopped contact as it was not healthy for her to be around her. Patient reports having family support along with support of boyfriend and other friends. Discussed grief and depressed mood as being normal responses. She will increased medication per PCP.  She reports no suicidal thoughts at this time. She continues to take medication as recommended. She has decreased cannabis use - using occasionally and does not consider it to be an issue Will see this writer in two weeks or earlier if needed. She may also call this writer if needed. Did not complete new DA due to time constraints.   Progress on Treatment Objective(s) / Homework:  Worsening - ACTION (Actively working towards change); Intervened by reinforcing change plan / affirming steps taken   Symptoms have increased due to events    Motivational Interviewing    MI Intervention: Co-Developed Goal: Decrease depression, Expressed Empathy/Understanding, Supported Autonomy, Collaboration, Evocation, Open-ended questions, Reflections: simple and complex, Change talk (evoked) and Reframe     Change Talk Expressed by the Patient: Desire to change Reasons to change Need to change    Provider Response to Change Talk: E - Evoked more info from patient about behavior change, A - Affirmed patient's thoughts, decisions, or attempts at behavior  change, R - Reflected patient's change talk and S - Summarized patient's change talk statements      Care Plan review completed: No    Medication Review:  No changes to current psychiatric medication(s)    Medication Compliance:  Yes    Changes in Health Issues:   None reported    Chemical Use Review:   Substance Use: increase in cannabis .  Client reports frequency of use Daily use.  Reviewed information and resources for treatment and ongoing sobriety  Provided encouragement towards sobriety  Provided support and affirmation for steps taken towards sobriety         Tobacco Use: No current tobacco use.      Assessment: Current Emotional / Mental Status (status of significant symptoms):  Risk status (Self / Other harm or suicidal ideation)  Patient has had a history of suicidal ideation: No attempts-thoughts only /on and off for the past 3 years  Patient denies current fears or concerns for personal safety.  Patient denies current or recent suicidal ideation or behaviors. has not had any thoughts for several weeks  Patient denies current or recent homicidal ideation or behaviors.  Patient denies current or recent self injurious behavior or ideation.  Patient denies other safety concerns.  A safety and risk management plan has not been developed at this time, however patient was encouraged to call [place] [phone] should there be a change in any of these risk factors.    Appearance:   Appropriate   Eye Contact:   Good   Psychomotor Behavior: Normal   Attitude:   Cooperative   Orientation:   All  Speech   Rate / Production: Normal    Volume:  Soft   Mood:    Anxious  Normal  Affect:    Appropriate   Thought Content:  Clear   Thought Form:  Coherent  Logical   Insight:    Good     Diagnoses:  1. Moderate major depression (H)    2. ZITA (generalized anxiety disorder)        Collateral Reports Completed:  Communicated with: Primary CARE provider as needed    Plan: (Homework, other):  Patient was  encouraged to schedule  a follow up appointment with the clinic [facility] in 2 weeks.  She was also given deep Breathing Strategies to practice when experiencing anxiety and depression and strategies to maintain sobriety.  CD Recommendations: No indications of CD issues. LULY Allison, [facility]      ______________________________________________________________________    Integrated Primary Care Behavioral Health Treatment Plan    Patient's Name: Loni Neumann  YOB: 2000    Date: July 30, 2018    DSM-V Diagnoses: 296.32 (F33.1) Major Depressive Disorder, Recurrent Episode, Moderate _ and With anxious distress or 300.02 (F41.1) Generalized Anxiety Disorder  Psychosocial / Contextual Factors: Patient is 18 years old  WHODAS: 38    Referral / Collaboration:  The following referral(s) was/were discussed but client declines follow up at this time. continue to reassess as needed.    Anticipated number of session or this episode of care: 3-8      MeasurableTreatment Goal(s) related to diagnosis / functional impairment(s)  Goal 1: Patient will decrease depression and anxiety by 50% as indicated by PHQ9 score, GAD7 score and self-report    I will know I've met my goal when I have more social connections and activities in my life.      Objective #A (Patient Action)    Patient will Increase interest, engagement, and pleasure in doing things  Decrease frequency and intensity of feeling down, depressed, hopeless  Feel less tired and more energy during the day   Identify negative self-talk and behaviors: challenge core beliefs, myths, and actions  Improve concentration, focus, and mindfulness in daily activities   Decrease thoughts that you'd be better off dead or of suicide / self-harm  Status: New - Date: 7-     Intervention(s)  [facility] will assign homework as discussed in session  make referrals to [facility] therapy   - continue to discuss with patient  teach emotional recognition/identification. recognize events/thoughts that lead to   uncomfortable emotions, negative thoughts, hopelessness and suicidal thoughts. .    Objective #B  Patient will Decrease thoughts that you'd be better off dead or of suicide / self-harm  Status: New - Date: 7-     Intervention(s)  [facility] will assign homework as discussed in session  provide the client with phone numbers for after-hours emergencies and instructions for how to contact the therapist  teach distraction skills. activities/ exercises to do when feeling hopeless, lonely and or suicidal.    Patient has reviewed and agreed to the above plan.    Written by  Jelly Merino LICBENY, [facility]

## 2019-12-04 NOTE — PROGRESS NOTES
[facility]  November 21, 2019      Behavioral Health Clinician Progress Note    Patient Name: Loni Neumann           Service Type: Individual      Service Location:  in clinic      Session Start Time: 305 PM  session End Time:355PM      Session Length: 38 - 52      Attendees: Patient    Visit Activities (Refresh list every visit): [facility] Only    Diagnostic Assessment Date: 5/14/2018  Treatment Plan Review Date: 10-  See Flowsheets for today's PHQ-9 and ZITA-7 results  Previous PHQ-9:   PHQ-9 SCORE 8/26/2019 11/11/2019 11/21/2019   PHQ-9 Total Score 2 12 9     Previous ZIAT-7:   ZITA-7 SCORE 8/26/2019 11/11/2019 11/21/2019   Total Score 3 4 5       DON LEVEL:  DON Score (Last Two) 5/14/2018   DON Raw Score 27   Activation Score 47.4   DON Level 2       DATA  Extended Session (60+ minutes): No  Interactive Complexity: No  Crisis: No    Treatment Objective(s) Addressed in This Session:  Target Behavior(s): disease management/lifestyle changes Decrease depression and anxiety and Decrease/discontinue cannabis use    Depressed Mood: Increase interest, engagement, and pleasure in doing things  Decrease frequency and intensity of feeling down, depressed, hopeless  Improve quantity and quality of night time sleep / decrease daytime naps  Feel less tired and more energy during the day   Improve diet, appetite, mindful eating, and / or meal planning  Identify negative self-talk and behaviors: challenge core beliefs, myths, and actions  Improve concentration, focus, and mindfulness in daily activities   Feel less fidgety, restless or slow in daily activities / interpersonal interactions  Decrease thoughts that you'd be better off dead or of suicide / self-harm  Anxiety: will experience a reduction in anxiety, will develop more effective coping skills to manage anxiety symptoms, will develop healthy cognitive patterns and beliefs and will increase ability to function adaptively  Functional  Impairment: will effectively address problems that interfere with adaptive functioning  Alcohol / Substance Use: will increase understanding of the effects of substance use  will make healthier choices in regard to substance use  will discuss/consider potential need for formal substance use evaluation, treatment and/or support  continue to make healthy choices regarding substance use and engage in activities / supportive services that promote sobriety    Current Stressors / Issues:  Patient reports depression and anxiety continue to be  increased due to recent suicide of best friend since childhood. Reviewed  coping behaviors.  She continues to have low motivation and is very tired. She reports she will decrease work hours for a few weeks. She reports no suicidal thoughts at this time. She continues to take medication as recommended. She has decreased cannabis use - using occasionally and does not consider it to be an issue Will see this writer as needed.  She may also call this writer if needed. Did not complete new DA due to time constraints.   Progress on Treatment Objective(s) / Homework:  Minimal progress - PREPARATION (Decided to change - considering how); Intervened by negotiating a change plan and determining options / strategies for behavior change, identifying triggers, exploring social supports, and working towards setting a date to begin behavior change   Symptoms have increased due to events    Motivational Interviewing    MI Intervention: Co-Developed Goal: Decrease depression, Expressed Empathy/Understanding, Supported Autonomy, Collaboration, Evocation, Open-ended questions, Reflections: simple and complex, Change talk (evoked) and Reframe     Change Talk Expressed by the Patient: Desire to change Reasons to change Need to change    Provider Response to Change Talk: E - Evoked more info from patient about behavior change, A - Affirmed patient's thoughts, decisions, or attempts at behavior change, R -  Reflected patient's change talk and S - Summarized patient's change talk statements      Care Plan review completed: No    Medication Review:  No changes to current psychiatric medication(s)    Medication Compliance:  Yes    Changes in Health Issues:   None reported    Chemical Use Review:   Substance Use: increase in cannabis .  Client reports frequency of use Daily use.  Reviewed information and resources for treatment and ongoing sobriety  Provided encouragement towards sobriety  Provided support and affirmation for steps taken towards sobriety         Tobacco Use: No current tobacco use.      Assessment: Current Emotional / Mental Status (status of significant symptoms):  Risk status (Self / Other harm or suicidal ideation)  Patient has had a history of suicidal ideation: No attempts-thoughts only /on and off for the past 3 years  Patient denies current fears or concerns for personal safety.  Patient denies current or recent suicidal ideation or behaviors. has not had any thoughts for several weeks  Patient denies current or recent homicidal ideation or behaviors.  Patient denies current or recent self injurious behavior or ideation.  Patient denies other safety concerns.  A safety and risk management plan has not been developed at this time, however patient was encouraged to call [place] [phone] should there be a change in any of these risk factors.    Appearance:   Appropriate   Eye Contact:   Good   Psychomotor Behavior: Normal   Attitude:   Cooperative   Orientation:   All  Speech   Rate / Production: Normal    Volume:  Soft   Mood:    Anxious  Normal  Affect:    Appropriate   Thought Content:  Clear   Thought Form:  Coherent  Logical   Insight:    Good     Diagnoses:  1. Moderate major depression (H)    2. ZITA (generalized anxiety disorder)        Collateral Reports Completed:  Communicated with: Primary CARE provider as needed    Plan: (Homework, other):  Patient was  encouraged to schedule a follow up  appointment with the clinic [facility] as needed.  She was also given deep Breathing Strategies to practice when experiencing anxiety and depression and strategies to maintain sobriety.  CD Recommendations: No indications of CD issues. LULY Allison, [facility]      ______________________________________________________________________    Integrated Primary Care Behavioral Health Treatment Plan    Patient's Name: Loni Neumann  YOB: 2000    Date: July 30, 2018    DSM-V Diagnoses: 296.32 (F33.1) Major Depressive Disorder, Recurrent Episode, Moderate _ and With anxious distress or 300.02 (F41.1) Generalized Anxiety Disorder  Psychosocial / Contextual Factors: Patient is 18 years old  WHODAS: 38    Referral / Collaboration:  The following referral(s) was/were discussed but client declines follow up at this time. continue to reassess as needed.    Anticipated number of session or this episode of care: 3-8      MeasurableTreatment Goal(s) related to diagnosis / functional impairment(s)  Goal 1: Patient will decrease depression and anxiety by 50% as indicated by PHQ9 score, GAD7 score and self-report    I will know I've met my goal when I have more social connections and activities in my life.      Objective #A (Patient Action)    Patient will Increase interest, engagement, and pleasure in doing things  Decrease frequency and intensity of feeling down, depressed, hopeless  Feel less tired and more energy during the day   Identify negative self-talk and behaviors: challenge core beliefs, myths, and actions  Improve concentration, focus, and mindfulness in daily activities   Decrease thoughts that you'd be better off dead or of suicide / self-harm  Status: New - Date: 7-     Intervention(s)  [facility] will assign homework as discussed in session  make referrals to [facility]   - continue to discuss with patient  teach emotional recognition/identification. recognize events/thoughts that lead to  uncomfortable  emotions, negative thoughts, hopelessness and suicidal thoughts. .    Objective #B  Patient will Decrease thoughts that you'd be better off dead or of suicide / self-harm  Status: New - Date: 7-     Intervention(s)  [facility] will assign homework as discussed in session  provide the client with phone numbers for after-hours emergencies and instructions for how to contact the therapist  teach distraction skills. activities/ exercises to do when feeling hopeless, lonely and or suicidal.    Patient has reviewed and agreed to the above plan.    Written by  Jelly Merino, LICBENY, [facility]

## 2020-09-10 ENCOUNTER — OFFICE VISIT (OUTPATIENT)
Dept: FAMILY MEDICINE | Facility: CLINIC | Age: 20
End: 2020-09-10
Payer: COMMERCIAL

## 2020-09-10 VITALS
OXYGEN SATURATION: 98 % | DIASTOLIC BLOOD PRESSURE: 68 MMHG | HEIGHT: 61 IN | SYSTOLIC BLOOD PRESSURE: 104 MMHG | WEIGHT: 136 LBS | TEMPERATURE: 97.9 F | RESPIRATION RATE: 18 BRPM | BODY MASS INDEX: 25.68 KG/M2 | HEART RATE: 87 BPM

## 2020-09-10 DIAGNOSIS — G44.209 TENSION HEADACHE: Primary | ICD-10-CM

## 2020-09-10 PROCEDURE — 99213 OFFICE O/P EST LOW 20 MIN: CPT | Performed by: NURSE PRACTITIONER

## 2020-09-10 RX ORDER — NAPROXEN 500 MG/1
500 TABLET ORAL 2 TIMES DAILY WITH MEALS
Qty: 30 TABLET | Refills: 0 | Status: SHIPPED | OUTPATIENT
Start: 2020-09-10 | End: 2020-10-22

## 2020-09-10 ASSESSMENT — ANXIETY QUESTIONNAIRES
7. FEELING AFRAID AS IF SOMETHING AWFUL MIGHT HAPPEN: NOT AT ALL
GAD7 TOTAL SCORE: 2
2. NOT BEING ABLE TO STOP OR CONTROL WORRYING: NOT AT ALL
6. BECOMING EASILY ANNOYED OR IRRITABLE: SEVERAL DAYS
1. FEELING NERVOUS, ANXIOUS, OR ON EDGE: NOT AT ALL
3. WORRYING TOO MUCH ABOUT DIFFERENT THINGS: NOT AT ALL
IF YOU CHECKED OFF ANY PROBLEMS ON THIS QUESTIONNAIRE, HOW DIFFICULT HAVE THESE PROBLEMS MADE IT FOR YOU TO DO YOUR WORK, TAKE CARE OF THINGS AT HOME, OR GET ALONG WITH OTHER PEOPLE: NOT DIFFICULT AT ALL
5. BEING SO RESTLESS THAT IT IS HARD TO SIT STILL: SEVERAL DAYS

## 2020-09-10 ASSESSMENT — PATIENT HEALTH QUESTIONNAIRE - PHQ9
SUM OF ALL RESPONSES TO PHQ QUESTIONS 1-9: 4
5. POOR APPETITE OR OVEREATING: NOT AT ALL

## 2020-09-10 ASSESSMENT — MIFFLIN-ST. JEOR: SCORE: 1319.27

## 2020-09-10 NOTE — PROGRESS NOTES
Subjective     Loni Neumann is a 20 year old female who presents to clinic today for the following health issues:    HPI       Headache  Onset/Duration: 2 weeks ago   Description  Location: bilateral in the frontal area, bilateral in the temporal area, starts in the back of her neck    Character: squeezing pain  Frequency:  Everyday for 2 weeks   Duration:  Everyday all day  Wake with headaches: YES  Able to do daily activities when headache present: no   Intensity:  severe  Progression of Symptoms:  worsening  Accompanying signs and symptoms:  Stiff neck: YES  Neck or upper back pain: YES  Sinus or URI symptoms YES- little of sinus   Fever: no  Nausea or vomiting: YES  Dizziness: YES  Numbness/tingling: YES- tingling   Weakness: YES  Visual changes: pressure behind eyes   History  Head trauma: no  Family history of migraines: no  Daily pain medication use: YES- tylenol Ibuprofen and aleve   Previous tests for headaches: no  Neurologist evaluation: no  Precipitating or Alleviating factors (light/sound/sleep/caffeine): light and sound make it worse sleep does help   Therapies tried and outcome: Ibuprofen (Advil, Motrin), Naproxyn (Aleve) and Tylenol    Outcome - not effective  Frequent/daily pain medication use: YES- taking medication x2 daily    Above HPI reviewed. Additionally, headache starts at the neck, is bitemporal and over the frontal region. No fever, URI symptoms, cough, congestion, anosmia, ageusia, abdominal pain. Denies visual changes, photophobia, phonophobia. Occasional nausea, has had looser stools every few days but no watery stools, hematochezia or melena. No history of migraines. No family history of migraines. Headache came on gradually and she denies a thunderclap feature. Not worse at night. 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 Work seems to worsen the headaches. Has appointment with her chiropractor tomorrow.       Review of Systems   Constitutional, HEENT, cardiovascular,  pulmonary, gi and gu systems are negative, except as otherwise noted.      Objective    There were no vitals taken for this visit.  There is no height or weight on file to calculate BMI.  Physical Exam   General Appearance:  Alert, cooperative, no distress, appears stated age. Afebrile. Appears comfortable sitting in chair.  Integument: Warm, dry, no rashes or lesions.  HEENT: Atraumatic, normocephalic. Face nontraumatic. Conjunctiva clear, Lids normal. PERRL. EOMI. Tongue and uvula midline. Bilateral TMs normal  Neck: Supple. No Cspine tenderness.  TTP of bilateral trapezius muscles with associated muscular spasm. Neck ROM intact and full for fwd and lateral flexion, extension, and lateral rotation. No meningismus.  Respiratory: No distress. Equal air entry to bilateral bases. Lungs clear to ausculation bilaterally. No crackles, wheezes, rhonchi or stridor.  Cardiovascular: Regular rate and rhythm, no murmur, rub or gallop. No obvious chest wall deformities.  GI: abdomen is soft, non-distended, and nontender  Musculoskeletal: Back: No C, T, or Lspine tenderness or crepitus to palpation. Strength testing: shoulder abduction, fwd flexion 5/5 bilaterally. Elbow flexion/extension,  strength 5/5 bilaterally.Gait: observed, no antalgia or abnormalities. Steady gait.  Neurologic: Alert and orientated appropriately. No focal deficits.   Psych: Normal mood and affect.            Assessment & Plan     Tension headache  Neuro exam is normal. No red flag symptoms to suggest malicious etiology such as structural lesion, SAH, meningitis. History and exam consistent with a tension headache. Significant TTP of bilateral trapezius. Will try zanaflex, naproxen. Encouraged massage, heat. Follow up if not improving.  - tiZANidine (ZANAFLEX) 4 MG tablet; Take 1 tablet (4 mg) by mouth 3 times daily  - naproxen (NAPROSYN) 500 MG tablet; Take 1 tablet (500 mg) by mouth 2 times daily (with meals)     Tobacco Cessation:   reports that  "she has been smoking. She has been smoking about 0.00 packs per day. She has never used smokeless tobacco.      BMI:   Estimated body mass index is 25.7 kg/m  as calculated from the following:    Height as of this encounter: 1.549 m (5' 1\").    Weight as of this encounter: 61.7 kg (136 lb).           Patient Instructions   Try naproxen and tizanidine.  Try getting a massage.  Let me know if not improving.      Return in about 1 week (around 9/17/2020) for worsening or continued symptoms.    ALFONZO Sidhu [facility]    "

## 2020-09-11 ASSESSMENT — ANXIETY QUESTIONNAIRES: GAD7 TOTAL SCORE: 2

## 2020-09-14 ENCOUNTER — TELEPHONE (OUTPATIENT)
Dept: FAMILY MEDICINE | Facility: CLINIC | Age: 20
End: 2020-09-14

## 2020-09-14 DIAGNOSIS — G44.209 TENSION HEADACHE: Primary | ICD-10-CM

## 2020-09-14 RX ORDER — RIZATRIPTAN BENZOATE 5 MG/1
5 TABLET ORAL
Qty: 15 TABLET | Refills: 0 | Status: SHIPPED | OUTPATIENT
Start: 2020-09-14 | End: 2020-09-25

## 2020-09-14 NOTE — TELEPHONE ENCOUNTER
"Yanely,    Patient states the medications are hard on her stomach.  She has tried ice and chiropractor for her \"no stop headaches\".  No allergies to any medications.  Would like to try something else to try for her headaches.  She has not gotten to make an massage appt yet but is seeing chiropractor again today.  Uses [facility]. Flavia HANNAH RN    "

## 2020-09-14 NOTE — TELEPHONE ENCOUNTER
Reason for Call:  Medication question:    Do you use a [facility]?  Name of the pharmacy and phone number for the current request:  [facility] [phone]    Name of the medication requested: Patient given Naproxen and ZanaflexPatient was prescribed Naproxen and Zanaflex for headaches.  Naproxen doesn't help and Zanaflex hurts her stomach      Can we leave a detailed message on this number? YES    Phone number patient can be reached at: Home number on file [phone] (home)    Best Time: Any    Call taken on 9/14/2020 at 11:50 AM by Michelle Pimentel

## 2020-09-14 NOTE — TELEPHONE ENCOUNTER
Patient Is called with new Rx and the need to be seen if this doesn't improve her [place]. Flavia HANNAH RN

## 2020-09-14 NOTE — TELEPHONE ENCOUNTER
Can try Maxalt, I sent this to her pharmacy. Take one, and if not improving repeat dose in 2 hours. If not improving with this, massage, and chiropractor, she will need to be seen again.

## 2020-09-25 ENCOUNTER — OFFICE VISIT (OUTPATIENT)
Dept: FAMILY MEDICINE | Facility: CLINIC | Age: 20
End: 2020-09-25
Payer: COMMERCIAL

## 2020-09-25 VITALS
WEIGHT: 138.3 LBS | OXYGEN SATURATION: 98 % | SYSTOLIC BLOOD PRESSURE: 106 MMHG | RESPIRATION RATE: 14 BRPM | BODY MASS INDEX: 26.11 KG/M2 | HEART RATE: 88 BPM | HEIGHT: 61 IN | DIASTOLIC BLOOD PRESSURE: 62 MMHG | TEMPERATURE: 98 F

## 2020-09-25 DIAGNOSIS — F12.20 CANNABIS DEPENDENCE (H): ICD-10-CM

## 2020-09-25 DIAGNOSIS — G44.209 TENSION HEADACHE: Primary | ICD-10-CM

## 2020-09-25 DIAGNOSIS — F32.1 MODERATE MAJOR DEPRESSION (H): ICD-10-CM

## 2020-09-25 DIAGNOSIS — F41.1 GAD (GENERALIZED ANXIETY DISORDER): ICD-10-CM

## 2020-09-25 DIAGNOSIS — Z30.011 ENCOUNTER FOR INITIAL PRESCRIPTION OF CONTRACEPTIVE PILLS: ICD-10-CM

## 2020-09-25 PROCEDURE — 99214 OFFICE O/P EST MOD 30 MIN: CPT | Performed by: NURSE PRACTITIONER

## 2020-09-25 RX ORDER — RIZATRIPTAN BENZOATE 10 MG/1
10 TABLET ORAL
Qty: 12 TABLET | Refills: 0 | Status: SHIPPED | OUTPATIENT
Start: 2020-09-25 | End: 2020-10-22

## 2020-09-25 RX ORDER — NORGESTREL AND ETHINYL ESTRADIOL 0.3-0.03MG
1 KIT ORAL DAILY
Qty: 84 TABLET | Refills: 3 | Status: SHIPPED | OUTPATIENT
Start: 2020-09-25 | End: 2020-10-22

## 2020-09-25 RX ORDER — RIZATRIPTAN BENZOATE 5 MG/1
5 TABLET ORAL
Qty: 15 TABLET | Refills: 0 | Status: CANCELLED | OUTPATIENT
Start: 2020-09-25

## 2020-09-25 RX ORDER — CYCLOBENZAPRINE HCL 5 MG
5 TABLET ORAL 3 TIMES DAILY PRN
Qty: 30 TABLET | Refills: 1 | Status: SHIPPED | OUTPATIENT
Start: 2020-09-25 | End: 2021-06-21

## 2020-09-25 RX ORDER — AMITRIPTYLINE HYDROCHLORIDE 10 MG/1
TABLET ORAL
Qty: 60 TABLET | Refills: 1 | Status: SHIPPED | OUTPATIENT
Start: 2020-09-25 | End: 2020-10-22

## 2020-09-25 ASSESSMENT — MIFFLIN-ST. JEOR: SCORE: 1329.7

## 2020-09-25 NOTE — PROGRESS NOTES
Subjective     Loni Neumann is a 20 year old female who presents to clinic today for the following health issues:    HPI   Migraine     Since your last clinic visit, how have your headaches changed?  Improved with meds, no change in frequency.    How often are you getting headaches or migraines? Daily, onset is often after eating.    Are you able to do normal daily activities when you have a migraine? No, missed work this week.    Are you taking rescue/relief medications? (Select all that apply) Maxalt    How helpful is your rescue/relief medication?  I get total relief. Started by taking 1 tablet at onset of headache (Maxalt) and it did not take it totally away so she took 2 tabs at onset and that has helped.     Are you taking any medications to prevent migraines? (Select all that apply)  No    In the past 4 weeks, how often have you gone to urgent care or the emergency room because of your headaches?  0       How many servings of fruits and vegetables do you eat daily?  2-3    On average, how many sweetened beverages do you drink each day (Examples: soda, juice, sweet tea, etc.  Do NOT count diet or artificially sweetened beverages)?   2    How many days per week do you exercise enough to make your heart beat faster? 5    How many minutes a day do you exercise enough to make your heart beat faster? Works 8hrs a day housekeeping.     How many days per week do you miss taking your medication? 0  Since last visit patient has been taking rizatriptan daily. Naproxen does not help, and tizanidine makes her feel sick (nausea). She has been trying to drink more water. Patient reports vague additional symptoms with her headaches.     Patient stopped taking her antidepressant 2 months ago.      Depression and Anxiety Follow-Up    How are you doing with your depression since your last visit? Stopped medications.    How are you doing with your anxiety since your last visit?  Worsened, has noticed increase in anxiety  "since she stopped her medication.    Are you having other symptoms that might be associated with depression or anxiety? Yes:  Headaches    Have you had a significant life event? No     Do you have any concerns with your use of alcohol or other drugs? No     Patient is requesting to restart Prozac because she gets more depressed in the winter.     Social History     Tobacco Use     Smoking status: Current Some Day Smoker     Packs/day: 0.00     Smokeless tobacco: Never Used     Tobacco comment: E-cig   Substance Use Topics     Alcohol use: No     Alcohol/week: 0.0 standard drinks     Drug use: Yes     Types: Marijuana     Comment: 2-3 times per month     PHQ 11/11/2019 11/21/2019 9/10/2020   PHQ-9 Total Score 12 9 4   Q9: Thoughts of better off dead/self-harm past 2 weeks Not at all Not at all Not at all     ZITA-7 SCORE 11/11/2019 11/21/2019 9/10/2020   Total Score 4 5 2       Patient requesting refill of her birth control pills.    Review of Systems   CONSTITUTIONAL: NEGATIVE for fever, chills, change in weight  EYES: NEGATIVE for vision changes or irritation  ENT/MOUTH: POSITIVE for mild jaw pain. NEGATIVE for ear, mouth and throat problems  RESP: NEGATIVE for significant cough or SOB  CV: NEGATIVE for chest pain, palpitations or peripheral edema.   MUSCULOSKELETAL: POSITIVE for upper back/neck pain. NEGATIVE for other significant arthralgias or myalgia. and myalgia, back pain and neck pain  ROS otherwise negative      Objective    /62 (BP Location: Right arm, Patient Position: Sitting, Cuff Size: Adult Regular)   Pulse 88   Temp 98  F (36.7  C) (Tympanic)   Resp 14   Ht 1.549 m (5' 1\")   Wt 62.7 kg (138 lb 4.8 oz)   SpO2 98%   BMI 26.13 kg/m    Body mass index is 26.13 kg/m .  Physical Exam   GENERAL: healthy, alert and no distress  HEAD: normocephalic no gross deformities. No masses, lumps, or tenderness.  EYES: Cranial nerves II-XII intact. Eyes grossly normal to inspection, PERRL and conjunctivae " and sclerae normal.  MOUTH: mucosa pink, moist intact. No lesions. Positive for mild tenderness at TMJ - pt reports it is tender from working with her chiropractor recently.  NECK: no adenopathy, no asymmetry, masses, or scars and thyroid normal to palpation. Full ROM.  RESP: lungs clear to auscultation - no rales, rhonchi or wheezes  CV: regular rate and rhythm, normal S1 S2, no S3 or S4, no murmur, click or rub, no peripheral edema and peripheral pulses strong.  MS: no gross musculoskeletal defects noted, no edema.  SKIN: no bruising, discoloration, or suspicious lesions or rashes.  NEURO: Normal strength and tone, mentation intact and speech normal     No results found for this or any previous visit (from the past 24 hour(s)).        Assessment & Plan     Loni was seen today for headache.    Diagnoses and all orders for this visit:    ZITA (generalized anxiety disorder)  -     FLUoxetine (PROZAC) 20 MG capsule; Take 1 capsule (20 mg) by mouth daily for 7 days, THEN 2 capsules (40 mg) daily.    Tension headache  -     rizatriptan (MAXALT) 10 MG tablet; Take 1 tablet (10 mg) by mouth at onset of headache for migraine May repeat in 2 hours. Max 3 tablets/24 hours.  -     cyclobenzaprine (FLEXERIL) 5 MG tablet; Take 1 tablet (5 mg) by mouth 3 times daily as needed for muscle spasms  -     amitriptyline (ELAVIL) 10 MG tablet; Take 1 tablet (10 mg) by mouth At Bedtime for 7 days, THEN 2 tablets (20 mg) At Bedtime.    Cannabis dependence (H)    Moderate major depression (H)  -     FLUoxetine (PROZAC) 20 MG capsule; Take 1 capsule (20 mg) by mouth daily for 7 days, THEN 2 capsules (40 mg) daily.    Encounter for initial prescription of contraceptive pills  -     norgestrel-ethinyl estradiol (ELINEST) 0.3-30 MG-MCG tablet; Take 1 tablet by mouth daily    Start a headache diary to track symptoms.  Start amitriptyline for migraine prevention, start with 10 mg daily and increase after 7 days if migraines have not  "significantly decreased in frequency.  Increase dose of rizatriptan rescue medication. Provided education this should only be taken 2-3 days per week. Changed muscle relaxer to flexeril.    Headaches could be related to stopping antidepressant medications and increased anxiety. Restart Fluoxetine for depression and anxiety.      Tobacco Cessation:   reports that she has been smoking. She has been smoking about 0.00 packs per day. She has never used smokeless tobacco.        BMI:   Estimated body mass index is 26.13 kg/m  as calculated from the following:    Height as of this encounter: 1.549 m (5' 1\").    Weight as of this encounter: 62.7 kg (138 lb 4.8 oz).           FUTURE APPOINTMENTS:       - Due for annual exam in about 4 weeks, will reassess headaches and anxiety at that visit.  See Patient Instructions    Return in about 4 weeks (around 10/23/2020) for Recheck symptoms, Medication Follow up.    Kavya Mckeon NP  [facility]    "

## 2020-09-25 NOTE — PATIENT INSTRUCTIONS
- Apply cool compress or ice pack to forehead every 2 hours.  - Rest in a quiet darkened room  - Take usual pain medication (aspirin, ibuprofen, acetaminophen) and follow instructions on the label  - Try heat to back of neck paired with cool compress to forehead  - Consider avoiding chocolate, milk products, cheddar and blue cheeses, cured pork, caffeine, MSG, and red wine    Report to clinic/ED if pain worsens, fever present, nasal discharge  Seek emergency care if:  - sudden onset of weakness, numbness or tingling on one side of body  - fever and stiff neck  - confusion, severe drowsiness, or difficulty speaking'  - persistent vomiting    Avoid using Maxalt (Rizatriptan more than 2-3 times per week).    Start preventative Amitriptyline at 10 mg at bedtime once daily - then increase to 20 mg after 1 week if not improvement    Follow up in 1 month.    PRIMITIVO Hackett

## 2020-10-21 ASSESSMENT — ENCOUNTER SYMPTOMS
FREQUENCY: 0
DIARRHEA: 0
HEADACHES: 0
JOINT SWELLING: 0
EYE PAIN: 0
NERVOUS/ANXIOUS: 1
CHILLS: 0
PARESTHESIAS: 0
FEVER: 0
DYSURIA: 0
HEARTBURN: 0
HEMATURIA: 0
MYALGIAS: 0
HEMATOCHEZIA: 0
BREAST MASS: 0
SORE THROAT: 0
SHORTNESS OF BREATH: 0
DIZZINESS: 0
COUGH: 0
CONSTIPATION: 0
NAUSEA: 0
PALPITATIONS: 0
WEAKNESS: 0
ABDOMINAL PAIN: 0
ARTHRALGIAS: 0

## 2020-10-22 ENCOUNTER — OFFICE VISIT (OUTPATIENT)
Dept: FAMILY MEDICINE | Facility: CLINIC | Age: 20
End: 2020-10-22
Payer: COMMERCIAL

## 2020-10-22 VITALS
TEMPERATURE: 97.8 F | SYSTOLIC BLOOD PRESSURE: 116 MMHG | HEIGHT: 61 IN | RESPIRATION RATE: 14 BRPM | DIASTOLIC BLOOD PRESSURE: 82 MMHG | BODY MASS INDEX: 26.3 KG/M2 | WEIGHT: 139.3 LBS

## 2020-10-22 DIAGNOSIS — F32.1 MODERATE MAJOR DEPRESSION (H): ICD-10-CM

## 2020-10-22 DIAGNOSIS — Z30.011 ENCOUNTER FOR INITIAL PRESCRIPTION OF CONTRACEPTIVE PILLS: ICD-10-CM

## 2020-10-22 DIAGNOSIS — G44.209 TENSION HEADACHE: ICD-10-CM

## 2020-10-22 DIAGNOSIS — Z00.00 ROUTINE GENERAL MEDICAL EXAMINATION AT A HEALTH CARE FACILITY: Primary | ICD-10-CM

## 2020-10-22 DIAGNOSIS — F41.1 GAD (GENERALIZED ANXIETY DISORDER): ICD-10-CM

## 2020-10-22 DIAGNOSIS — Z23 NEED FOR PROPHYLACTIC VACCINATION AND INOCULATION AGAINST INFLUENZA: ICD-10-CM

## 2020-10-22 PROCEDURE — 90471 IMMUNIZATION ADMIN: CPT | Performed by: NURSE PRACTITIONER

## 2020-10-22 PROCEDURE — 90686 IIV4 VACC NO PRSV 0.5 ML IM: CPT | Performed by: NURSE PRACTITIONER

## 2020-10-22 PROCEDURE — 99214 OFFICE O/P EST MOD 30 MIN: CPT | Mod: 25 | Performed by: NURSE PRACTITIONER

## 2020-10-22 PROCEDURE — 99395 PREV VISIT EST AGE 18-39: CPT | Mod: 25 | Performed by: NURSE PRACTITIONER

## 2020-10-22 RX ORDER — NORGESTREL AND ETHINYL ESTRADIOL 0.3-0.03MG
1 KIT ORAL DAILY
Qty: 84 TABLET | Refills: 3 | Status: SHIPPED | OUTPATIENT
Start: 2020-10-22 | End: 2023-05-02

## 2020-10-22 RX ORDER — RIZATRIPTAN BENZOATE 10 MG/1
10 TABLET ORAL
Qty: 9 TABLET | Refills: 11 | Status: SHIPPED | OUTPATIENT
Start: 2020-10-22 | End: 2021-06-21

## 2020-10-22 RX ORDER — AMITRIPTYLINE HYDROCHLORIDE 10 MG/1
20 TABLET ORAL AT BEDTIME
Qty: 60 TABLET | Refills: 11 | Status: SHIPPED | OUTPATIENT
Start: 2020-10-22 | End: 2021-06-21

## 2020-10-22 ASSESSMENT — ENCOUNTER SYMPTOMS
FREQUENCY: 0
HEARTBURN: 0
NERVOUS/ANXIOUS: 1
DIZZINESS: 0
PALPITATIONS: 0
COUGH: 0
HEMATOCHEZIA: 0
HEMATURIA: 0
MYALGIAS: 0
HEADACHES: 0
DYSURIA: 0
EYE PAIN: 0
CHILLS: 0
BREAST MASS: 0
FEVER: 0
PARESTHESIAS: 0
JOINT SWELLING: 0
SORE THROAT: 0
DIARRHEA: 0
CONSTIPATION: 0
WEAKNESS: 0
ARTHRALGIAS: 0
SHORTNESS OF BREATH: 0
NAUSEA: 0
ABDOMINAL PAIN: 0

## 2020-10-22 ASSESSMENT — MIFFLIN-ST. JEOR: SCORE: 1334.24

## 2020-10-22 NOTE — PROGRESS NOTES
SUBJECTIVE:   CC: Loni Neumann is an 20 year old woman who presents for preventive health visit.       Patient has been advised of split billing requirements and indicates understanding: Yes     Healthy Habits:     Getting at least 3 servings of Calcium per day:  Yes    Bi-annual eye exam:  NO    Dental care twice a year:  Yes    Sleep apnea or symptoms of sleep apnea:  None    Diet:  Regular (no restrictions)    Frequency of exercise:  2-3 days/week    Duration of exercise:  30-45 minutes    Taking medications regularly:  Yes    Barriers to taking medications:  None    Medication side effects:  None    PHQ-2 Total Score: 0    Additional concerns today:  No      Depression and Anxiety Follow-Up    How are you doing with your depression since your last visit? Improved - with working.    How are you doing with your anxiety since your last visit?  No change    Are you having other symptoms that might be associated with depression or anxiety? No    Have you had a significant life event? No     Do you have any concerns with your use of alcohol or other drugs? No    Social History     Tobacco Use     Smoking status: Current Some Day Smoker     Packs/day: 0.00     Smokeless tobacco: Never Used     Tobacco comment: E-cig   Substance Use Topics     Alcohol use: No     Alcohol/week: 0.0 standard drinks     Drug use: Yes     Types: Marijuana     Comment: 2-3 times per month     PHQ 11/11/2019 11/21/2019 9/10/2020   PHQ-9 Total Score 12 9 4   Q9: Thoughts of better off dead/self-harm past 2 weeks Not at all Not at all Not at all     ZITA-7 SCORE 11/11/2019 11/21/2019 9/10/2020   Total Score 4 5 2     Last PHQ-9 9/10/2020   1.  Little interest or pleasure in doing things 0   2.  Feeling down, depressed, or hopeless 0   3.  Trouble falling or staying asleep, or sleeping too much 1   4.  Feeling tired or having little energy 1   5.  Poor appetite or overeating 1   6.  Feeling bad about yourself 0   7.  Trouble concentrating  0   8.  Moving slowly or restless 1   Q9: Thoughts of better off dead/self-harm past 2 weeks 0   PHQ-9 Total Score 4   Difficulty at work, home, or with people Not difficult at all     ZITA-7  9/10/2020   1. Feeling nervous, anxious, or on edge 0   2. Not being able to stop or control worrying 0   3. Worrying too much about different things 0   4. Trouble relaxing 0   5. Being so restless that it is hard to sit still 1   6. Becoming easily annoyed or irritable 1   7. Feeling afraid, as if something awful might happen 0   ZITA-7 Total Score 2   If you checked any problems, how difficult have they made it for you to do your work, take care of things at home, or get along with other people? Not difficult at all       Suicide Assessment Five-step Evaluation and Treatment (SAFE-T)    Migraine     Since your last clinic visit, how have your headaches changed?  Improved    How often are you getting headaches or migraines? 1-2 X per week.     Are you able to do normal daily activities when you have a migraine? No    Are you taking rescue/relief medications? (Select all that apply) Maxalt - 1-2 X per week.    How helpful is your rescue/relief medication?  I get total relief    Are you taking any medications to prevent migraines? (Select all that apply)  Amitriptyline    In the past 4 weeks, how often have you gone to urgent care or the emergency room because of your headaches?  0      Today's PHQ-2 Score:   PHQ-2 ( 1999 Pfizer) 10/21/2020   Q1: Little interest or pleasure in doing things 0   Q2: Feeling down, depressed or hopeless 0   PHQ-2 Score 0   Q1: Little interest or pleasure in doing things Not at all   Q2: Feeling down, depressed or hopeless Not at all   PHQ-2 Score 0       Abuse: Current or Past (Physical, Sexual or Emotional) - No  Do you feel safe in your environment? Yes        Social History     Tobacco Use     Smoking status: Current Some Day Smoker     Packs/day: 0.00     Smokeless tobacco: Never Used      Tobacco comment: E-cig   Substance Use Topics     Alcohol use: No     Alcohol/week: 0.0 standard drinks     If you drink alcohol do you typically have >3 drinks per day or >7 drinks per week? No     No flowsheet data found.    Reviewed orders with patient.  Reviewed health maintenance and updated orders accordingly - Yes  Lab work is in process  Labs reviewed in EPIC  BP Readings from Last 3 Encounters:   10/22/20 116/82   09/25/20 106/62   09/10/20 104/68    Wt Readings from Last 3 Encounters:   10/22/20 63.2 kg (139 lb 4.8 oz)   09/25/20 62.7 kg (138 lb 4.8 oz)   09/10/20 61.7 kg (136 lb)                  Patient Active Problem List   Diagnosis     Cannabis dependence (H)     Moderate major depression (H)     ZITA (generalized anxiety disorder)     Encounter for initial prescription of contraceptive pills     History reviewed. No pertinent surgical history.    Social History     Tobacco Use     Smoking status: Current Some Day Smoker     Packs/day: 0.00     Smokeless tobacco: Never Used     Tobacco comment: E-cig   Substance Use Topics     Alcohol use: No     Alcohol/week: 0.0 standard drinks     Family History   Problem Relation Age of Onset     Unknown/Adopted Father      Depression Father      Anxiety Disorder Father      Anxiety Disorder Brother      Attention Deficit Disorder Brother          Current Outpatient Medications   Medication Sig Dispense Refill     amitriptyline (ELAVIL) 10 MG tablet Take 1 tablet (10 mg) by mouth At Bedtime for 7 days, THEN 2 tablets (20 mg) At Bedtime. 60 tablet 1     cyclobenzaprine (FLEXERIL) 5 MG tablet Take 1 tablet (5 mg) by mouth 3 times daily as needed for muscle spasms 30 tablet 1     FLUoxetine (PROZAC) 20 MG capsule Take 1 capsule (20 mg) by mouth daily for 7 days, THEN 2 capsules (40 mg) daily. 60 capsule 3     hydrocortisone (WESTCORT) 0.2 % cream Apply topically 2 times daily Apply to affected area 45 g 2     norgestrel-ethinyl estradiol (ELINEST) 0.3-30 MG-MCG tablet  Take 1 tablet by mouth daily 84 tablet 3     rizatriptan (MAXALT) 10 MG tablet Take 1 tablet (10 mg) by mouth at onset of headache for migraine May repeat in 2 hours. Max 3 tablets/24 hours. 12 tablet 0     Allergies   Allergen Reactions     Penicillins Hives     No lab results found.     Mammogram not appropriate for this patient based on age.    Pertinent mammograms are reviewed under the imaging tab.  History of abnormal Pap smear: NO - age 21-29 PAP every 3 years recommended  Last 3 Pap Results: No results found for: PAP     Reviewed and updated as needed this visit by clinical staff  Tobacco  Allergies  Meds  Problems  Med Hx  Surg Hx  Fam Hx  Soc Hx          Reviewed and updated as needed this visit by Provider   Allergies  Meds  Problems              Past Medical History:   Diagnosis Date     Febrile convulsions (simple), unspecified       History reviewed. No pertinent surgical history.  OB History   No obstetric history on file.       Review of Systems   Constitutional: Negative for chills and fever.   HENT: Negative for congestion, ear pain and sore throat.    Eyes: Negative for pain and visual disturbance.   Respiratory: Negative for cough and shortness of breath.    Cardiovascular: Negative for chest pain, palpitations and peripheral edema.   Gastrointestinal: Negative for abdominal pain, constipation, diarrhea, heartburn, hematochezia and nausea.   Breasts:  Negative for tenderness, breast mass and discharge.   Genitourinary: Negative for dysuria, frequency, genital sores, hematuria, pelvic pain, urgency, vaginal bleeding and vaginal discharge.   Musculoskeletal: Negative for arthralgias, joint swelling and myalgias.   Skin: Negative for rash.   Neurological: Negative for dizziness, weakness, headaches and paresthesias.   Psychiatric/Behavioral: Negative for mood changes. The patient is nervous/anxious.    POS LMP 10/22/2020      OBJECTIVE:   /82 (BP Location: Left arm, Patient Position:  "Sitting, Cuff Size: Adult Regular)   Temp 97.8  F (36.6  C) (Tympanic)   Resp 14   Ht 1.549 m (5' 1\")   Wt 63.2 kg (139 lb 4.8 oz)   LMP 10/21/2020 (Exact Date)   BMI 26.32 kg/m    Physical Exam  GENERAL: healthy, alert and no distress  EYES: Eyes grossly normal to inspection, PERRL and conjunctivae and sclerae normal  HENT: ear canals and TM's normal, nose and mouth without ulcers or lesions  NECK: no adenopathy, no asymmetry, masses, or scars and thyroid normal to palpation  RESP: lungs clear to auscultation - no rales, rhonchi or wheezes  CV: regular rate and rhythm, normal S1 S2, no S3 or S4, no murmur, click or rub, no peripheral edema and peripheral pulses strong  ABDOMEN: soft, nontender, no hepatosplenomegaly, no masses and bowel sounds normal   (female): declined - has period today.  MS: no gross musculoskeletal defects noted, no edema  SKIN: no suspicious lesions or rashes  NEURO: Normal strength and tone, mentation intact and speech normal  PSYCH: mentation appears normal, affect normal/bright    Diagnostic Test Results:  Labs reviewed in Epic    ASSESSMENT/PLAN:   1. Routine general medical examination at a health care facility       2. Moderate major depression (H)  Improved.  - FLUoxetine (PROZAC) 20 MG capsule; Take 2 capsules (40 mg) by mouth daily  Dispense: 180 capsule; Refill: 3    3. Tension headache  Improved.  - amitriptyline (ELAVIL) 10 MG tablet; Take 2 tablets (20 mg) by mouth At Bedtime  Dispense: 60 tablet; Refill: 11  - rizatriptan (MAXALT) 10 MG tablet; Take 1 tablet (10 mg) by mouth at onset of headache for migraine May repeat in 2 hours. Max 3 tablets/24 hours.  Dispense: 9 tablet; Refill: 11    4. ZITA (generalized anxiety disorder)  Improved.  - FLUoxetine (PROZAC) 20 MG capsule; Take 2 capsules (40 mg) by mouth daily  Dispense: 180 capsule; Refill: 3    5. Encounter for initial prescription of contraceptive pills     - norgestrel-ethinyl estradiol (ELINEST) 0.3-30 MG-MCG " "tablet; Take 1 tablet by mouth daily  Dispense: 84 tablet; Refill: 3    6. Need for prophylactic vaccination and inoculation against influenza     - INFLUENZA VACCINE IM > 6 MONTHS VALENT IIV4 [71666]  - Vaccine Administration, Initial [91007]    Patient has been advised of split billing requirements and indicates understanding: Yes  COUNSELING:  Reviewed preventive health counseling, as reflected in patient instructions       Regular exercise       Healthy diet/nutrition       Safe sex practices/STD prevention    Estimated body mass index is 26.32 kg/m  as calculated from the following:    Height as of this encounter: 1.549 m (5' 1\").    Weight as of this encounter: 63.2 kg (139 lb 4.8 oz).        She reports that she has been smoking. She has been smoking about 0.00 packs per day. She has never used smokeless tobacco.  Tobacco Cessation Action Plan:   Information offered: Patient not interested at this time      Counseling Resources:  ATP IV Guidelines  Pooled Cohorts Equation Calculator  Breast Cancer Risk Calculator  BRCA-Related Cancer Risk Assessment: FHS-7 Tool  FRAX Risk Assessment  ICSI Preventive Guidelines  Dietary Guidelines for Americans, 2010  USDA's MyPlate  ASA Prophylaxis  Lung CA Screening    Kavya Mckeon NP  [facility]  "

## 2020-10-22 NOTE — PATIENT INSTRUCTIONS
Preventive Health Recommendations  Female Ages 21 to 25     Yearly exam:     See your health care provider every year in order to  o Review health changes.   o Discuss preventive care.    o Review your medicines if your doctor has prescribed any.      You should be tested each year for STDs (sexually transmitted diseases).       Talk to your provider about how often you should have cholesterol testing.      Get a Pap test every three years. If you have an abnormal result, your doctor may have you test more often.      If you are at risk for diabetes, you should have a diabetes test (fasting glucose).     Shots:     Get a flu shot each year.     Get a tetanus shot every 10 years.     Consider getting the shot (vaccine) that prevents cervical cancer (Gardasil).    Nutrition:     Eat at least 5 servings of fruits and vegetables each day.    Eat whole-grain bread, whole-wheat pasta and brown rice instead of white grains and rice.    Get adequate Calcium and Vitamin D.     Lifestyle    Exercise at least 150 minutes a week each week (30 minutes a day, 5 days a week). This will help you control your weight and prevent disease.    Limit alcohol to one drink per day.    No smoking.     Wear sunscreen to prevent skin cancer.    See your dentist every six months for an exam and cleaning.    PRIMITIVO Hackett

## 2020-11-05 ENCOUNTER — OFFICE VISIT (OUTPATIENT)
Dept: FAMILY MEDICINE | Facility: CLINIC | Age: 20
End: 2020-11-05
Payer: COMMERCIAL

## 2020-11-05 VITALS
HEART RATE: 84 BPM | WEIGHT: 137.7 LBS | BODY MASS INDEX: 26 KG/M2 | TEMPERATURE: 97.8 F | RESPIRATION RATE: 14 BRPM | OXYGEN SATURATION: 98 % | SYSTOLIC BLOOD PRESSURE: 110 MMHG | HEIGHT: 61 IN | DIASTOLIC BLOOD PRESSURE: 70 MMHG

## 2020-11-05 DIAGNOSIS — Z11.3 SCREEN FOR STD (SEXUALLY TRANSMITTED DISEASE): ICD-10-CM

## 2020-11-05 DIAGNOSIS — Z12.4 SCREENING FOR MALIGNANT NEOPLASM OF CERVIX: Primary | ICD-10-CM

## 2020-11-05 PROCEDURE — G0145 SCR C/V CYTO,THINLAYER,RESCR: HCPCS | Performed by: NURSE PRACTITIONER

## 2020-11-05 PROCEDURE — 99213 OFFICE O/P EST LOW 20 MIN: CPT | Performed by: NURSE PRACTITIONER

## 2020-11-05 PROCEDURE — 87491 CHLMYD TRACH DNA AMP PROBE: CPT | Performed by: NURSE PRACTITIONER

## 2020-11-05 PROCEDURE — 87591 N.GONORRHOEAE DNA AMP PROB: CPT | Performed by: NURSE PRACTITIONER

## 2020-11-05 ASSESSMENT — MIFFLIN-ST. JEOR: SCORE: 1326.98

## 2020-11-05 NOTE — PROGRESS NOTES
"Wang Neumann is a 20 year old female who presents to clinic today for the following health issues:    HPI          Patient presents today for her PAP. Was seen 10/22/20 for her physical.   Only needs pap today.    No vaginal or urinary symptoms.  Currently is sexually active - has had new partners and unprotected sex.    Last tested for gonorrhea/chlamydia 08/2019.    Needs gonorrhea/chlamydia testing today per patient request.      Review of Systems   Constitutional, HEENT, cardiovascular, pulmonary, GI, , musculoskeletal, neuro, skin, endocrine and psych systems are negative, except as otherwise noted.      Objective    /70 (BP Location: Left arm, Patient Position: Sitting, Cuff Size: Adult Regular)   Pulse 84   Temp 97.8  F (36.6  C) (Tympanic)   Resp 14   Ht 1.549 m (5' 1\")   Wt 62.5 kg (137 lb 11.2 oz)   LMP 10/21/2020 (Exact Date)   SpO2 98%   BMI 26.02 kg/m    Body mass index is 26.02 kg/m .  Physical Exam   GENERAL: healthy, alert and no distress  RESP: lungs clear to auscultation - no rales, rhonchi or wheezes  CV: regular rate and rhythm, normal S1 S2, no S3 or S4, no murmur, click or rub, no peripheral edema and peripheral pulses strong   (female): normal female external genitalia, normal urethral meatus, vaginal mucosa, normal cervix/adnexa/uterus without masses or discharge; pap and gonorrhea/chlamydia obtained today.    No results found for this or any previous visit (from the past 24 hour(s)).        Assessment & Plan     Screening for malignant neoplasm of cervix     - Pap imaged thin layer screen only - recommended age 21 - 24 years    Screen for STD (sexually transmitted disease)     - NEISSERIA GONORRHOEA PCR  - CHLAMYDIA TRACHOMATIS PCR        See Patient Instructions    No follow-ups on file.    Kavya Mckeon NP  [facility]    "

## 2020-11-09 LAB
COPATH REPORT: NORMAL
PAP: NORMAL

## 2021-05-07 ENCOUNTER — OFFICE VISIT (OUTPATIENT)
Dept: FAMILY MEDICINE | Facility: CLINIC | Age: 21
End: 2021-05-07
Payer: COMMERCIAL

## 2021-05-07 VITALS
TEMPERATURE: 98.3 F | HEIGHT: 61 IN | SYSTOLIC BLOOD PRESSURE: 113 MMHG | DIASTOLIC BLOOD PRESSURE: 71 MMHG | HEART RATE: 112 BPM | WEIGHT: 135.7 LBS | RESPIRATION RATE: 14 BRPM | OXYGEN SATURATION: 99 % | BODY MASS INDEX: 25.62 KG/M2

## 2021-05-07 DIAGNOSIS — J30.2 SEASONAL ALLERGIC RHINITIS, UNSPECIFIED TRIGGER: ICD-10-CM

## 2021-05-07 DIAGNOSIS — J01.90 ACUTE SINUSITIS WITH SYMPTOMS > 10 DAYS: Primary | ICD-10-CM

## 2021-05-07 DIAGNOSIS — F41.1 GAD (GENERALIZED ANXIETY DISORDER): ICD-10-CM

## 2021-05-07 DIAGNOSIS — F32.1 MODERATE MAJOR DEPRESSION (H): ICD-10-CM

## 2021-05-07 PROCEDURE — 99214 OFFICE O/P EST MOD 30 MIN: CPT | Performed by: NURSE PRACTITIONER

## 2021-05-07 PROCEDURE — 96127 BRIEF EMOTIONAL/BEHAV ASSMT: CPT | Performed by: NURSE PRACTITIONER

## 2021-05-07 RX ORDER — DOXYCYCLINE 100 MG/1
100 CAPSULE ORAL 2 TIMES DAILY
Qty: 14 CAPSULE | Refills: 0 | Status: SHIPPED | OUTPATIENT
Start: 2021-05-07 | End: 2021-05-14

## 2021-05-07 RX ORDER — FLUTICASONE PROPIONATE 50 MCG
1 SPRAY, SUSPENSION (ML) NASAL DAILY
Qty: 15.8 ML | Refills: 3 | Status: SHIPPED | OUTPATIENT
Start: 2021-05-07 | End: 2023-05-02

## 2021-05-07 ASSESSMENT — PATIENT HEALTH QUESTIONNAIRE - PHQ9
5. POOR APPETITE OR OVEREATING: NOT AT ALL
SUM OF ALL RESPONSES TO PHQ QUESTIONS 1-9: 3

## 2021-05-07 ASSESSMENT — ANXIETY QUESTIONNAIRES
GAD7 TOTAL SCORE: 1
2. NOT BEING ABLE TO STOP OR CONTROL WORRYING: SEVERAL DAYS
IF YOU CHECKED OFF ANY PROBLEMS ON THIS QUESTIONNAIRE, HOW DIFFICULT HAVE THESE PROBLEMS MADE IT FOR YOU TO DO YOUR WORK, TAKE CARE OF THINGS AT HOME, OR GET ALONG WITH OTHER PEOPLE: NOT DIFFICULT AT ALL
3. WORRYING TOO MUCH ABOUT DIFFERENT THINGS: NOT AT ALL
1. FEELING NERVOUS, ANXIOUS, OR ON EDGE: NOT AT ALL
6. BECOMING EASILY ANNOYED OR IRRITABLE: NOT AT ALL
5. BEING SO RESTLESS THAT IT IS HARD TO SIT STILL: NOT AT ALL
7. FEELING AFRAID AS IF SOMETHING AWFUL MIGHT HAPPEN: NOT AT ALL

## 2021-05-07 ASSESSMENT — MIFFLIN-ST. JEOR: SCORE: 1312.91

## 2021-05-07 NOTE — PATIENT INSTRUCTIONS
Prescription written for antibiotics to be taken twice daily for 7 days.  Advised to take entire course of antibiotic despite improvement in symptoms.    Start Flonase nasal spray each nostril daily for allergies.  Continue daily over the counter allergy medication.    Follow up if sinus symptoms not improving despite treatment.    Use Maxalt as needed for migraines or headaches.  Recommend Ibuprofen 600-800 mg every 8 hours - take with food - stop if GI upset occurs.    PRIMITIVO Hackett        Patient Education     Allergic Rhinitis  Allergic rhinitis is an allergic reaction that affects the nose, and often the eyes. It s often known as nasal allergies. Nasal allergies are often due to things in the environment that are breathed in. Depending what you are sensitive to, nasal allergies may occur only during certain seasons, or they may occur year round. Common indoor allergens include house dust mites, mold, cockroaches, and pet dander. Outdoor allergens include pollen from trees, grasses, and weeds.    Symptoms include a drippy, stuffy, and itchy nose. They also include sneezing and red and itchy eyes. You may feel tired more often. Severe allergies may also affect your breathing and trigger a condition called asthma.    Tests can be done to see what allergens are affecting you. You may be referred to an allergy specialist for testing and further evaluation.   Home care  Your healthcare provider may prescribe medicines to help relieve allergy symptoms. These may include oral medicines, nasal sprays, or eye drops.   Ask your provider for advice on how to stay away from substances that you are allergic to. Below are a few tips for each type of allergen.   Pet dander:    Do not have pets with fur and feathers.    If you have a pet, keep it out of your bedroom and off upholstered furniture.  Pollen:    When pollen counts are high, keep windows of your car and home closed. If possible, use an air conditioner  instead.    Wear a filter mask when mowing or doing yard work.  House dust mites:    Wash bedding every week in warm water and detergent and dry on a hot setting.    Cover the mattress, box spring, and pillows with allergy covers.     If possible, sleep in a room with no carpet, curtains, or upholstered furniture.  Cockroaches:    Store food in sealed containers.    Remove garbage from the home promptly.    Fix water leaks.  Mold:    Keep humidity low by using a dehumidifier or air conditioner. Keep the dehumidifier and air conditioner clean and free of mold.    Clean moldy areas with bleach and water. Don't mix bleach with other .  In general:    Vacuum once or twice a week. If possible, use a vacuum with a high-efficiency particulate air (HEPA) filter.    Don't smoke. Stay away from cigarette smoke. Cigarette smoke is an irritant that can make symptoms worse.  Follow-up care  Follow up as advised by the healthcare provider or our staff. If you were referred to an allergy specialist, make this appointment promptly.   When to seek medical advice  Call your healthcare provider or get medical care right away if the following occur:     Coughing    Fever of 100.4 F (38 C) or higher, or as directed by your healthcare provider    Raised red bumps (hives)    Continuing symptoms, new symptoms, or worsening symptoms  Call 911  Call 911 if you have:     Trouble breathing    Severe swelling of the face or severe itching of the eyes or mouth    Wheezing or shortness of breath    Chest tightness    Dizziness or lightheadedness    Feeling of doom    Stomach pain, bloating, vomiting, or diarrhea  [email or web address] last reviewed this educational content on 10/1/2019    0973-4661 [employer]. All rights reserved. This information is not intended as a substitute for professional medical care. Always follow your healthcare professional's instructions.           Patient Education     Sinusitis (Antibiotic  Treatment)    The sinuses are air-filled spaces within the bones of the face. They connect to the inside of the nose. Sinusitis is an inflammation of the tissue that lines the sinuses. Sinusitis can occur during a cold. It can also happen due to allergies to pollens and other particles in the air. Sinusitis can cause symptoms of sinus congestion and a feeling of fullness. A sinus infection causes fever, headache, and facial pain. There is often green or yellow fluid draining from the nose or into the back of the throat (post-nasal drip). You have been given antibiotics to treat this condition.   Home care    Take the full course of antibiotics as instructed. Don't stop taking them, even when you feel better.    Drink plenty of water, hot tea, and other liquids as directed by the healthcare provider. This may help thin nasal mucus. It also may help your sinuses drain fluids.    Heat may help soothe painful areas of your face. Use a towel soaked in hot water. Or,  the shower and direct the warm spray onto your face. Using a vaporizer along with a menthol rub at night may also help soothe symptoms.     An expectorant with guaifenesin may help thin nasal mucus and help your sinuses drain fluids. Talk with your provider or pharmacists before taking an over-the-counter (OTC) medicine if you have any questions about it or its side effects..    You can use an OTC decongestant, unless a similar medicine was prescribed to you. Nasal sprays work the fastest. Use one that contains phenylephrine or oxymetazoline. First blow your nose gently. Then use the spray. Don't use these medicines more often than directed on the label. If you do, your symptoms may get worse. You may also take pills that contain pseudoephedrine. Don t use products that combine multiple medicines. This is because side effects may be increased. Read labels. You can also ask the pharmacist for help. (People with high blood pressure should not use  decongestants. They can raise blood pressure.) Talk with your provider or pharmacist if you have any questions about the medicine..    OTC antihistamines may help if allergies contributed to your sinusitis. Talk with your provider or pharmacist if you have any questions about the medicine..    Don't use nasal rinses or irrigation during an acute sinus infection, unless your healthcare provider tells you to. Rinsing may spread the infection to other areas in your sinuses.    Use acetaminophen or ibuprofen to control pain, unless another pain medicine was prescribed to you. If you have chronic liver or kidney disease or ever had a stomach ulcer, talk with your healthcare provider before using these medicines. Never give aspirin to anyone under age 18 who is ill with a fever. It may cause severe liver damage.    Don't smoke. This can make symptoms worse.    Follow-up care  Follow up with your healthcare provider, or as advised.   When to seek medical advice  Call your healthcare provider if any of these occur:     Facial pain or headache that gets worse    Stiff neck    Unusual drowsiness or confusion    Swelling of your forehead or eyelids    Symptoms don't go away in 10 days    Vision problems, such as blurred or double vision    Fever of 100.4 F (38 C) or higher, or as directed by your healthcare provider  Call 911  Call 911 if any of these occur:     Seizure    Trouble breathing    Feeling dizzy or faint    Fingernails, skin or lips look blue, purple , or gray  Prevention  Here are steps you can take to help prevent an infection:     Keep good hand washing habits.    Don t have close contact with people who have sore throats, colds, or other upper respiratory infections.    Don t smoke, and stay away from secondhand smoke.    Stay up to date with of your vaccines.  [employer] last reviewed this educational content on 12/1/2019 2000-2021 [employer]. All rights reserved. This information is not  intended as a substitute for professional medical care. Always follow your healthcare professional's instructions.

## 2021-05-07 NOTE — PROGRESS NOTES
"    Assessment & Plan     Acute sinusitis with symptoms > 10 days  - fluticasone (FLONASE) 50 MCG/ACT nasal spray  Dispense: 15.8 mL; Refill: 3  - doxycycline hyclate (VIBRAMYCIN) 100 MG capsule  Dispense: 14 capsule; Refill: 0    Seasonal allergic rhinitis, unspecified trigger  - fluticasone (FLONASE) 50 MCG/ACT nasal spray  Dispense: 15.8 mL; Refill: 3    Moderate major depression (H)  - FLUoxetine (PROZAC) 20 MG capsule  Dispense: 180 capsule; Refill: 3    ZITA (generalized anxiety disorder)  - FLUoxetine (PROZAC) 20 MG capsule  Dispense: 180 capsule; Refill: 3    Treating for acute sinusitis since symptoms have been present > 1 month and worsening for > 10 days. Treating with 7 day course of doxycycline - pt allergic to penicillin. Discussed symptom management with rest, hydration, ibuprofen, neti-pots, mucinex as needed. Stop OTC allergy/sinus combo decongestant. Follow up if symptoms fail to improve or worsen.    For allergy control start Flonase, continue daily allergy medication, non-pharm measures to limit allergen exposure in the home.        Mood symptoms are currently well managed. Medications refilled. Follow up as needed.       BMI:   Estimated body mass index is 25.64 kg/m  as calculated from the following:    Height as of this encounter: 1.549 m (5' 1\").    Weight as of this encounter: 61.6 kg (135 lb 11.2 oz).       MEDICATIONS:        - Continue other medications without change  Work on weight loss  Regular exercise  See Patient Instructions  Patient Instructions   Prescription written for antibiotics to be taken twice daily for 7 days.  Advised to take entire course of antibiotic despite improvement in symptoms.    Start Flonase nasal spray each nostril daily for allergies.  Continue daily over the counter allergy medication.    Follow up if sinus symptoms not improving despite treatment.    Use Maxalt as needed for migraines or headaches.  Recommend Ibuprofen 600-800 mg every 8 hours - take with " food - stop if GI upset occurs.    PRIMITIVO Hackett        Patient Education     Allergic Rhinitis  Allergic rhinitis is an allergic reaction that affects the nose, and often the eyes. It s often known as nasal allergies. Nasal allergies are often due to things in the environment that are breathed in. Depending what you are sensitive to, nasal allergies may occur only during certain seasons, or they may occur year round. Common indoor allergens include house dust mites, mold, cockroaches, and pet dander. Outdoor allergens include pollen from trees, grasses, and weeds.    Symptoms include a drippy, stuffy, and itchy nose. They also include sneezing and red and itchy eyes. You may feel tired more often. Severe allergies may also affect your breathing and trigger a condition called asthma.    Tests can be done to see what allergens are affecting you. You may be referred to an allergy specialist for testing and further evaluation.   Home care  Your healthcare provider may prescribe medicines to help relieve allergy symptoms. These may include oral medicines, nasal sprays, or eye drops.   Ask your provider for advice on how to stay away from substances that you are allergic to. Below are a few tips for each type of allergen.   Pet dander:    Do not have pets with fur and feathers.    If you have a pet, keep it out of your bedroom and off upholstered furniture.  Pollen:    When pollen counts are high, keep windows of your car and home closed. If possible, use an air conditioner instead.    Wear a filter mask when mowing or doing yard work.  House dust mites:    Wash bedding every week in warm water and detergent and dry on a hot setting.    Cover the mattress, box spring, and pillows with allergy covers.     If possible, sleep in a room with no carpet, curtains, or upholstered furniture.  Cockroaches:    Store food in sealed containers.    Remove garbage from the home promptly.    Fix water leaks.  Mold:    Keep  humidity low by using a dehumidifier or air conditioner. Keep the dehumidifier and air conditioner clean and free of mold.    Clean moldy areas with bleach and water. Don't mix bleach with other .  In general:    Vacuum once or twice a week. If possible, use a vacuum with a high-efficiency particulate air (HEPA) filter.    Don't smoke. Stay away from cigarette smoke. Cigarette smoke is an irritant that can make symptoms worse.  Follow-up care  Follow up as advised by the healthcare provider or our staff. If you were referred to an allergy specialist, make this appointment promptly.   When to seek medical advice  Call your healthcare provider or get medical care right away if the following occur:     Coughing    Fever of 100.4 F (38 C) or higher, or as directed by your healthcare provider    Raised red bumps (hives)    Continuing symptoms, new symptoms, or worsening symptoms  Call 911  Call 911 if you have:     Trouble breathing    Severe swelling of the face or severe itching of the eyes or mouth    Wheezing or shortness of breath    Chest tightness    Dizziness or lightheadedness    Feeling of doom    Stomach pain, bloating, vomiting, or diarrhea  [employer] last reviewed this educational content on 10/1/2019    1421-8805 [employer]. All rights reserved. This information is not intended as a substitute for professional medical care. Always follow your healthcare professional's instructions.           Patient Education     Sinusitis (Antibiotic Treatment)    The sinuses are air-filled spaces within the bones of the face. They connect to the inside of the nose. Sinusitis is an inflammation of the tissue that lines the sinuses. Sinusitis can occur during a cold. It can also happen due to allergies to pollens and other particles in the air. Sinusitis can cause symptoms of sinus congestion and a feeling of fullness. A sinus infection causes fever, headache, and facial pain. There is often green or  yellow fluid draining from the nose or into the back of the throat (post-nasal drip). You have been given antibiotics to treat this condition.   Home care    Take the full course of antibiotics as instructed. Don't stop taking them, even when you feel better.    Drink plenty of water, hot tea, and other liquids as directed by the healthcare provider. This may help thin nasal mucus. It also may help your sinuses drain fluids.    Heat may help soothe painful areas of your face. Use a towel soaked in hot water. Or,  the shower and direct the warm spray onto your face. Using a vaporizer along with a menthol rub at night may also help soothe symptoms.     An expectorant with guaifenesin may help thin nasal mucus and help your sinuses drain fluids. Talk with your provider or pharmacists before taking an over-the-counter (OTC) medicine if you have any questions about it or its side effects..    You can use an OTC decongestant, unless a similar medicine was prescribed to you. Nasal sprays work the fastest. Use one that contains phenylephrine or oxymetazoline. First blow your nose gently. Then use the spray. Don't use these medicines more often than directed on the label. If you do, your symptoms may get worse. You may also take pills that contain pseudoephedrine. Don t use products that combine multiple medicines. This is because side effects may be increased. Read labels. You can also ask the pharmacist for help. (People with high blood pressure should not use decongestants. They can raise blood pressure.) Talk with your provider or pharmacist if you have any questions about the medicine..    OTC antihistamines may help if allergies contributed to your sinusitis. Talk with your provider or pharmacist if you have any questions about the medicine..    Don't use nasal rinses or irrigation during an acute sinus infection, unless your healthcare provider tells you to. Rinsing may spread the infection to other areas in  your sinuses.    Use acetaminophen or ibuprofen to control pain, unless another pain medicine was prescribed to you. If you have chronic liver or kidney disease or ever had a stomach ulcer, talk with your healthcare provider before using these medicines. Never give aspirin to anyone under age 18 who is ill with a fever. It may cause severe liver damage.    Don't smoke. This can make symptoms worse.    Follow-up care  Follow up with your healthcare provider, or as advised.   When to seek medical advice  Call your healthcare provider if any of these occur:     Facial pain or headache that gets worse    Stiff neck    Unusual drowsiness or confusion    Swelling of your forehead or eyelids    Symptoms don't go away in 10 days    Vision problems, such as blurred or double vision    Fever of 100.4 F (38 C) or higher, or as directed by your healthcare provider  Call 911  Call 911 if any of these occur:     Seizure    Trouble breathing    Feeling dizzy or faint    Fingernails, skin or lips look blue, purple , or gray  Prevention  Here are steps you can take to help prevent an infection:     Keep good hand washing habits.    Don t have close contact with people who have sore throats, colds, or other upper respiratory infections.    Don t smoke, and stay away from secondhand smoke.    Stay up to date with of your vaccines.  [employer] last reviewed this educational content on 12/1/2019 2000-2021 [employer]. All rights reserved. This information is not intended as a substitute for professional medical care. Always follow your healthcare professional's instructions.               Return if symptoms worsen or fail to improve.    Kavya Mckeon NP  [facility]    Wang Ivey is a 21 year old who presents for the following health issues     HPI     ALLERGIES      Duration: seasonally     Description:   Nasal congestion: YES  Sneezing: YES- sometimes   Red, itchy eyes:  YES    Accompanying signs and symptoms: sinus pressure - inability to breath out of her nose.     History (similar episodes/allergy testing): None    Precipitating or alleviating factors: None    Therapies tried and outcome: Takes OTC sinus relief OTC allergy medication - does not help. Wonders if she could be referred for allergy injections.     Has seasonal allergies for years. Increased over 1 month but last 2 weeks have been worsening with increased congestion, pressure in temples, jaw, behind eyes, headaches.  No fevers, chills, night sweats, SOB, chest pain/tightness, cough, sore throat, or difficulty breathing.  Taking OTC decongestant and daily allergy pill and ibuprofen.    Depression and Anxiety Follow-Up    How are you doing with your depression since your last visit? No change    How are you doing with your anxiety since your last visit?  No change    Are you having other symptoms that might be associated with depression or anxiety? No    Have you had a significant life event? No     Do you have any concerns with your use of alcohol or other drugs? No  Moods have been good on current medications. Does not desire any changes today.     Social History     Tobacco Use     Smoking status: Current Some Day Smoker     Packs/day: 0.00     Smokeless tobacco: Never Used     Tobacco comment: E-cig   Substance Use Topics     Alcohol use: No     Alcohol/week: 0.0 standard drinks     Drug use: Yes     Types: Marijuana     Comment: 2-3 times per month     PHQ 11/21/2019 9/10/2020 5/7/2021   PHQ-9 Total Score 9 4 3   Q9: Thoughts of better off dead/self-harm past 2 weeks Not at all Not at all Not at all     ZITA-7 SCORE 11/21/2019 9/10/2020 5/7/2021   Total Score 5 2 1     Last PHQ-9 5/7/2021   1.  Little interest or pleasure in doing things 0   2.  Feeling down, depressed, or hopeless 0   3.  Trouble falling or staying asleep, or sleeping too much 0   4.  Feeling tired or having little energy 1   5.  Poor appetite or  overeating 1   6.  Feeling bad about yourself 1   7.  Trouble concentrating 0   8.  Moving slowly or restless 0   Q9: Thoughts of better off dead/self-harm past 2 weeks 0   PHQ-9 Total Score 3   Difficulty at work, home, or with people Not difficult at all     ZITA-7  5/7/2021   1. Feeling nervous, anxious, or on edge 0   2. Not being able to stop or control worrying 1   3. Worrying too much about different things 0   4. Trouble relaxing 0   5. Being so restless that it is hard to sit still 0   6. Becoming easily annoyed or irritable 0   7. Feeling afraid, as if something awful might happen 0   ZITA-7 Total Score 1   If you checked any problems, how difficult have they made it for you to do your work, take care of things at home, or get along with other people? Not difficult at all     Current Outpatient Medications   Medication     doxycycline hyclate (VIBRAMYCIN) 100 MG capsule     FLUoxetine (PROZAC) 20 MG capsule     fluticasone (FLONASE) 50 MCG/ACT nasal spray     hydrocortisone (WESTCORT) 0.2 % cream     norgestrel-ethinyl estradiol (ELINEST) 0.3-30 MG-MCG tablet     rizatriptan (MAXALT) 10 MG tablet     amitriptyline (ELAVIL) 10 MG tablet     cyclobenzaprine (FLEXERIL) 5 MG tablet     No current facility-administered medications for this visit.        Suicide Assessment Five-step Evaluation and Treatment (SAFE-T)      How many servings of fruits and vegetables do you eat daily?  2-3    On average, how many sweetened beverages do you drink each day (Examples: soda, juice, sweet tea, etc.  Do NOT count diet or artificially sweetened beverages)?   2    How many days per week do you exercise enough to make your heart beat faster? 6    How many minutes a day do you exercise enough to make your heart beat faster? 60 or more  How many days per week do you miss taking your medication? 3    What makes it hard for you to take your medications?  remembering to take      Review of Systems   Constitutional, HEENT,  "cardiovascular, pulmonary, gi and gu systems are negative, except as otherwise noted.      Objective    /71 (BP Location: Right arm, Patient Position: Sitting, Cuff Size: Adult Regular)   Pulse 112   Temp 98.3  F (36.8  C) (Tympanic)   Resp 14   Ht 1.549 m (5' 1\")   Wt 61.6 kg (135 lb 11.2 oz)   SpO2 99%   BMI 25.64 kg/m    Body mass index is 25.64 kg/m .  Physical Exam   GENERAL: healthy, alert and no distress   EYES: Eyes grossly normal to inspection, PERRL and conjunctivae and sclerae normal, no drainage or erythema noted.  HENT: voice sounds very nasal/congested. + tenderness with palpation of frontal, ethmoid, and maxillary sinuses. Ear canals clear, TM's normal but dull with tenderness on exam but no erythema or drainage, nasal mucosa erythematous and mild edema. Mouth without ulcers or lesions.   NECK: no adenopathy, no asymmetry, masses, or scars and thyroid normal to palpation  RESP: lungs clear to auscultation - no rales, rhonchi or wheezes  CV: regular rate and rhythm, normal S1 S2, no S3 or S4, no murmur, click or rub, no peripheral edema and peripheral pulses strong  SKIN: no suspicious lesions or rashes.  PSYCH: mentation appears normal, affect normal/bright    No results found for this or any previous visit (from the past 24 hour(s)).             "

## 2021-05-08 ASSESSMENT — ANXIETY QUESTIONNAIRES: GAD7 TOTAL SCORE: 1

## 2021-06-21 ENCOUNTER — HOSPITAL ENCOUNTER (EMERGENCY)
Facility: CLINIC | Age: 21
Discharge: HOME | End: 2021-06-21
Attending: NURSE PRACTITIONER | Admitting: NURSE PRACTITIONER
Payer: COMMERCIAL

## 2021-06-21 VITALS
RESPIRATION RATE: 18 BRPM | TEMPERATURE: 98.1 F | WEIGHT: 135 LBS | SYSTOLIC BLOOD PRESSURE: 116 MMHG | OXYGEN SATURATION: 99 % | BODY MASS INDEX: 25.51 KG/M2 | HEART RATE: 89 BPM | DIASTOLIC BLOOD PRESSURE: 87 MMHG

## 2021-06-21 DIAGNOSIS — R06.2 WHEEZING: ICD-10-CM

## 2021-06-21 PROCEDURE — G0463 HOSPITAL OUTPT CLINIC VISIT: HCPCS | Performed by: NURSE PRACTITIONER

## 2021-06-21 PROCEDURE — 99203 OFFICE O/P NEW LOW 30 MIN: CPT | Performed by: NURSE PRACTITIONER

## 2021-06-21 RX ORDER — GUAIFENESIN 600 MG/1
1200 TABLET, EXTENDED RELEASE ORAL 2 TIMES DAILY
Qty: 28 TABLET | Refills: 0 | Status: SHIPPED | OUTPATIENT
Start: 2021-06-21 | End: 2021-06-28

## 2021-06-21 RX ORDER — BENZONATATE 200 MG/1
200 CAPSULE ORAL 3 TIMES DAILY PRN
Qty: 30 CAPSULE | Refills: 0 | Status: SHIPPED | OUTPATIENT
Start: 2021-06-21 | End: 2021-08-23

## 2021-06-21 RX ORDER — PREDNISONE 20 MG/1
TABLET ORAL
Qty: 10 TABLET | Refills: 0 | Status: SHIPPED | OUTPATIENT
Start: 2021-06-21 | End: 2021-08-23

## 2021-06-21 NOTE — LETTER
June 21, 2021      To Whom It May Concern:      Loni Neumann was seen in our Emergency Department today, 06/21/21. Please excuse her from work today due to illness.    Sincerely,        ALFONZO Han CNP

## 2021-06-21 NOTE — ED PROVIDER NOTES
History     Chief Complaint   Patient presents with     Cough     HPI      SUBJECTIVE: Loni Neumann  is here today because of:Cough  The patient has had symptoms of cough, sore throat, chest congestion and decreased activity.   Onset of symptoms was 3 weeks ago. Course of illness is worsening.  Patient denies exposure to illness at home or work.   Patient denies fever, earache, vomiting, diarrhea and myalgias  Treatment measures tried include ibuprofen.  Patient is exposed to tobacco  THC daily  On May 7 was treated with doxycycline for a sinus infection    Allergies:  Allergies   Allergen Reactions     Penicillins Hives       Problem List:    Patient Active Problem List    Diagnosis Date Noted     Encounter for initial prescription of contraceptive pills 06/21/2018     Priority: Medium     Cannabis dependence (H) 04/27/2018     Priority: Medium     Moderate major depression (H) 04/27/2018     Priority: Medium     ZITA (generalized anxiety disorder) 04/27/2018     Priority: Medium        Past Medical History:    Past Medical History:   Diagnosis Date     Febrile convulsions (simple), unspecified        Past Surgical History:    No past surgical history on file.    Family History:    Family History   Problem Relation Age of Onset     Unknown/Adopted Father      Depression Father      Anxiety Disorder Father      Anxiety Disorder Brother      Attention Deficit Disorder Brother        Social History:  Marital Status:  Single [1]  Social History     Tobacco Use     Smoking status: Current Some Day Smoker     Packs/day: 0.00     Smokeless tobacco: Never Used     Tobacco comment: E-cig   Substance Use Topics     Alcohol use: No     Alcohol/week: 0.0 standard drinks     Drug use: Yes     Types: Marijuana     Comment: 2-3 times per month        Medications:    benzonatate (TESSALON) 200 MG capsule  guaiFENesin (MUCINEX) 600 MG 12 hr tablet  predniSONE (DELTASONE) 20 MG tablet  FLUoxetine (PROZAC) 20 MG  capsule  fluticasone (FLONASE) 50 MCG/ACT nasal spray  hydrocortisone (WESTCORT) 0.2 % cream  norgestrel-ethinyl estradiol (ELINEST) 0.3-30 MG-MCG tablet      Review of Systems  As mentioned above in the history present illness. All other systems were reviewed and are negative.    Physical Exam   BP: 116/87  Pulse: 89  Temp: 98.1  F (36.7  C)  Resp: 18  Weight: 61.2 kg (135 lb)  SpO2: 99 %      Physical Exam  GENERAL: alert and no acute distress  EYES:  Right conjunctiva is not injected and without discharge.  Left conjunctiva is not injected and without discharge.  EARS: Right TM is normal: no effusions, no erythema, and normal landmarks.  Left TM is normal: no effusions, no erythema, and normal landmarks.  NOSE: Nasal mucosa is normal.  Sinus not tender.  THROAT: normal.  NECK: supple with no adenopathy  CARDIAC:NORMAL - regular rate and rhythm without murmur.  RESP: ABNORMAL - bilateral wheezing  SKIN: normal    ED Course        Procedures    No results found for this or any previous visit (from the past 24 hour(s)).    Medications - No data to display    Assessments & Plan (with Medical Decision Making)     I have reviewed the nursing notes.    I have reviewed the findings, diagnosis, plan and need for follow up with the patient.  Medical Decision Making:  CXR is not indicated.  Rapid Strep test is not indicated.     Assessment:  1) wheezing.    PLAN:  Differential patient is of history of vaping and could be vaping related versus early COPD or a new onset asthma or bronchitis with wheezing. If bronchitis patient recently has been on doxycycline and does not need additional antibiotics. Will initiate Tessalon Perles, Mucinex, and prednisone with follow-up if no improvement. Patient discharged in stable condition.     Follow up with any questions or problems    Discharge Medication List as of 6/21/2021  2:19 PM      START taking these medications    Details   benzonatate (TESSALON) 200 MG capsule Take 1 capsule  (200 mg) by mouth 3 times daily as needed for cough, Disp-30 capsule, R-0, E-Prescribe      guaiFENesin (MUCINEX) 600 MG 12 hr tablet Take 2 tablets (1,200 mg) by mouth 2 times daily for 7 days, Disp-28 tablet, R-0, E-Prescribe      predniSONE (DELTASONE) 20 MG tablet Take two tablets (= 40mg) each day for 5 (five) days, Disp-10 tablet, R-0, E-Prescribe             Final diagnoses:   Wheezing       6/21/2021   [facility] EMERGENCY DEPT     Spencer, Judie Prater, APRN CNP  06/21/21 [id number]

## 2021-06-21 NOTE — DISCHARGE INSTRUCTIONS
Start prednisone today and take 2 pills every day for 5 days. The initial dosing should be as soon as you  the prescription and the remaining 4 days should be in the morning with breakfast.  Use the albuterol inhaler 1 or 2 puffs every 4-6 hours as needed for cough or shortness of breath.  Take Mucinex 1200 mg by mouth twice daily for 1 week to help loosen up all the secretions and help you to feel better. Use the Tessalon Perles as needed for cough. Follow-up on Thursday if no improvement in symptoms.

## 2021-06-21 NOTE — ED TRIAGE NOTES
Cough, sore throat, and nasal congestion for approximately 3 weeks. Patient denies fever.  Patient has not been vaccinated for COVID.

## 2021-07-01 ENCOUNTER — TELEPHONE (OUTPATIENT)
Dept: FAMILY MEDICINE | Facility: CLINIC | Age: 21
End: 2021-07-01

## 2021-07-01 NOTE — TELEPHONE ENCOUNTER
From: Loni Neumann   Sent: 6/21/2021   4:51 PM CDT   To: [facility] Reception [facility]   Subject: Prescriptions                                      Topic: Procedural Question      I was told i was going to be prescribed an albuterol inhaler by the doctor and it also says on my after visit paper, but they never gave me my inhaler when i went to go pick my other prescriptions up.

## 2021-07-01 NOTE — TELEPHONE ENCOUNTER
Attempted to reach pt but no answer and unable to leave a message.    Pt is active in her [employer] account.    Message sent to pt.      Message below was sent from [email or web address].    Pt was seen in ED on 6/21/21 for wheezing.    Does he still want an inhaler?  Update of symptoms please.    Mariah Alejo RN

## 2021-08-16 ENCOUNTER — MYC MEDICAL ADVICE (OUTPATIENT)
Dept: FAMILY MEDICINE | Facility: CLINIC | Age: 21
End: 2021-08-16

## 2021-08-23 ENCOUNTER — OFFICE VISIT (OUTPATIENT)
Dept: FAMILY MEDICINE | Facility: CLINIC | Age: 21
End: 2021-08-23
Payer: COMMERCIAL

## 2021-08-23 VITALS
TEMPERATURE: 97.9 F | RESPIRATION RATE: 16 BRPM | SYSTOLIC BLOOD PRESSURE: 104 MMHG | BODY MASS INDEX: 27.41 KG/M2 | OXYGEN SATURATION: 98 % | HEIGHT: 61 IN | DIASTOLIC BLOOD PRESSURE: 66 MMHG | HEART RATE: 81 BPM | WEIGHT: 145.2 LBS

## 2021-08-23 DIAGNOSIS — Z11.3 SCREEN FOR STD (SEXUALLY TRANSMITTED DISEASE): ICD-10-CM

## 2021-08-23 DIAGNOSIS — Z30.013 ENCOUNTER FOR INITIAL PRESCRIPTION OF INJECTABLE CONTRACEPTIVE: Primary | ICD-10-CM

## 2021-08-23 LAB — HCG UR QL: NEGATIVE

## 2021-08-23 PROCEDURE — 99213 OFFICE O/P EST LOW 20 MIN: CPT | Mod: 25 | Performed by: NURSE PRACTITIONER

## 2021-08-23 PROCEDURE — 81025 URINE PREGNANCY TEST: CPT | Performed by: NURSE PRACTITIONER

## 2021-08-23 PROCEDURE — 96372 THER/PROPH/DIAG INJ SC/IM: CPT | Performed by: NURSE PRACTITIONER

## 2021-08-23 PROCEDURE — 87591 N.GONORRHOEAE DNA AMP PROB: CPT | Performed by: NURSE PRACTITIONER

## 2021-08-23 PROCEDURE — 87491 CHLMYD TRACH DNA AMP PROBE: CPT | Performed by: NURSE PRACTITIONER

## 2021-08-23 RX ORDER — MEDROXYPROGESTERONE ACETATE 150 MG/ML
150 INJECTION, SUSPENSION INTRAMUSCULAR
Status: ACTIVE | OUTPATIENT
Start: 2021-08-23 | End: 2022-08-18

## 2021-08-23 RX ADMIN — MEDROXYPROGESTERONE ACETATE 150 MG: 150 INJECTION, SUSPENSION INTRAMUSCULAR at 08:57

## 2021-08-23 ASSESSMENT — MIFFLIN-ST. JEOR: SCORE: 1356

## 2021-08-23 NOTE — PROGRESS NOTES
Assessment & Plan     Encounter for initial prescription of injectable contraceptive    - medroxyPROGESTERone (DEPO-PROVERA) injection 150 mg  - HCG Qual, Urine ([id number]); Future  - Chlamydia trachomatis PCR; Future  - Chlamydia trachomatis PCR  - HCG Qual, Urine ([id number])    Screen for STD (sexually transmitted disease)    - Chlamydia trachomatis PCR; Future  - Neisseria gonorrhoeae PCR; Future  - Neisseria gonorrhoeae PCR  - Chlamydia trachomatis PCR    Return in about 3 months (around 11/23/2021), or contraception, Depo injection.    ALFONZO Noland CNP  [facility] SHIRAZ Ivey is a 21 year old who presents for the following health issues     Chief Complaint   Patient presents with     Contraception     She states she forgets to take her birth control pills so would like to switch to the depo injection          Clinic Administered Medication Documentation    Administrations This Visit     medroxyPROGESTERone (DEPO-PROVERA) injection 150 mg     Admin Date  08/23/2021 Action  Given Dose  150 mg Route  Intramuscular Site  Left Gluteus Jose Administered By  Mari Weber CMA    Ordering Provider: aLmar Rascon APRN CNP    NDC: 33335-239-67    Lot#: [id number]    : [facility]    Patient Supplied?: No                  Depo Provera Documentation    URINE HCG: negative    Depo-Provera Standing Order inclusion/exclusion criteria reviewed.   Patient meets: inclusion criteria     BP: 104/66  LAST PAP/EXAM:   Lab Results   Component Value Date    PAP NIL 11/05/2020       Prior to injection, verified patient identity using patient's name and date of birth. Medication was administered. Please see MAR and medication order for additional information.     Was entire vial of medication used? Yes  Vial/Syringe: Single dose vial  Expiration Date:  12/2022     Patient instructed to remain in clinic for 15 minutes.  NEXT INJECTION DUE: 11/8/21 -  "11/22/21        Review of Systems   Constitutional, HEENT, cardiovascular, pulmonary, gi and gu systems are negative, except as otherwise noted.      Objective    /66 (BP Location: Right arm, Patient Position: Sitting, Cuff Size: Adult Regular)   Pulse 81   Temp 97.9  F (36.6  C) (Tympanic)   Resp 16   Ht 1.549 m (5' 1\")   Wt 65.9 kg (145 lb 3.2 oz)   LMP 08/18/2021   SpO2 98%   BMI 27.44 kg/m    Body mass index is 27.44 kg/m .  Physical Exam   GENERAL: healthy, alert and no distress  EYES: Eyes grossly normal to inspection, PERRL and conjunctivae and sclerae normal  NEURO: Normal strength and tone, mentation intact and speech normal  PSYCH: mentation appears normal, affect normal/bright            "

## 2021-08-24 ENCOUNTER — HOSPITAL ENCOUNTER (EMERGENCY)
Facility: CLINIC | Age: 21
Discharge: HOME | End: 2021-08-24
Attending: PHYSICIAN ASSISTANT | Admitting: PHYSICIAN ASSISTANT
Payer: COMMERCIAL

## 2021-08-24 VITALS
BODY MASS INDEX: 27.4 KG/M2 | RESPIRATION RATE: 16 BRPM | WEIGHT: 145 LBS | HEART RATE: 89 BPM | DIASTOLIC BLOOD PRESSURE: 74 MMHG | TEMPERATURE: 97.8 F | SYSTOLIC BLOOD PRESSURE: 115 MMHG | OXYGEN SATURATION: 98 %

## 2021-08-24 DIAGNOSIS — T63.441A BEE STING REACTION: ICD-10-CM

## 2021-08-24 LAB
C TRACH DNA SPEC QL NAA+PROBE: NEGATIVE
N GONORRHOEA DNA SPEC QL NAA+PROBE: NEGATIVE

## 2021-08-24 PROCEDURE — G0463 HOSPITAL OUTPT CLINIC VISIT: HCPCS | Performed by: PHYSICIAN ASSISTANT

## 2021-08-24 PROCEDURE — 99214 OFFICE O/P EST MOD 30 MIN: CPT | Performed by: PHYSICIAN ASSISTANT

## 2021-08-24 RX ORDER — PREDNISONE 20 MG/1
TABLET ORAL
Qty: 10 TABLET | Refills: 0 | Status: SHIPPED | OUTPATIENT
Start: 2021-08-24 | End: 2023-05-02

## 2021-08-24 NOTE — ED PROVIDER NOTES
History     Chief Complaint   Patient presents with     Insect Bite     HPI  Loni Neumann is a 21 year old female who presents to the urgent care with concern over insect bite to her left forearm which occurred yesterday.  She was stung by a bee.  She subsequently developed itching, pain, erythema involving a larger portion of the arm.  She denies any fever, chills, myalgias, sore throat, cough, dyspnea, wheezing, nausea, vomiting, diarrhea or abdominal pain.  No generalized hives or rashes.  She has attempted to treat with Benadryl last night.  She has also been using topical hydrocortisone, ice without relief.  She denies any history of similar reactions to insect bites in the past.  No history of anaphylaxis.  Family states concern about the possibility of cellulitis.      Allergies:  Allergies   Allergen Reactions     Penicillins Hives       Problem List:    Patient Active Problem List    Diagnosis Date Noted     Encounter for initial prescription of contraceptive pills 06/21/2018     Priority: Medium     Cannabis dependence (H) 04/27/2018     Priority: Medium     Moderate major depression (H) 04/27/2018     Priority: Medium     ZITA (generalized anxiety disorder) 04/27/2018     Priority: Medium        Past Medical History:    Past Medical History:   Diagnosis Date     Febrile convulsions (simple), unspecified        Past Surgical History:    No past surgical history on file.    Family History:    Family History   Problem Relation Age of Onset     Unknown/Adopted Father      Depression Father      Anxiety Disorder Father      Anxiety Disorder Brother      Attention Deficit Disorder Brother        Social History:  Marital Status:  Single [1]  Social History     Tobacco Use     Smoking status: Current Some Day Smoker     Packs/day: 0.00     Smokeless tobacco: Never Used     Tobacco comment: E-cig   Substance Use Topics     Alcohol use: No     Alcohol/week: 0.0 standard drinks     Drug use: Yes     Types:  Marijuana     Comment: 2-3 times per month        Medications:    predniSONE (DELTASONE) 20 MG tablet  FLUoxetine (PROZAC) 20 MG capsule  fluticasone (FLONASE) 50 MCG/ACT nasal spray  hydrocortisone (WESTCORT) 0.2 % cream  norgestrel-ethinyl estradiol (ELINEST) 0.3-30 MG-MCG tablet      Review of Systems  CONSTITUTIONAL:NEGATIVE for fever, chills, change in weight  INTEGUMENTARY/SKIN: POSITIVE for erythema of left forearm NEGATIVE or other worrisome rashes or skin changes   RESP:NEGATIVE for significant cough or SOB  MUSCULOSKELETAL: POSITIVE  for left forearm pain, pruritis  NEURO: NEGATIVE for numbness or paresthesias   Physical Exam   BP: 115/74  Pulse: 89  Temp: 97.8  F (36.6  C)  Resp: 16  Weight: 65.8 kg (145 lb)  SpO2: 98 %  Physical Exam  Constitutional:       General: She is not in acute distress.     Appearance: She is not ill-appearing or toxic-appearing.   HENT:      Head: Normocephalic and atraumatic.   Cardiovascular:      Rate and Rhythm: Normal rate and regular rhythm.      Heart sounds: No murmur heard.   No friction rub. No gallop.    Pulmonary:      Effort: Pulmonary effort is normal. No respiratory distress.      Breath sounds: Normal breath sounds. No wheezing, rhonchi or rales.   Musculoskeletal:      Left elbow: Normal.      Left forearm: Swelling and tenderness present. No edema, deformity, lacerations or bony tenderness.      Left wrist: Normal.   Skin:     General: Skin is warm and dry.      Findings: Erythema (5 cm by 10 cm left forearm) present. No abrasion, ecchymosis or rash.   Neurological:      Mental Status: She is alert.      Sensory: No sensory deficit.       ED Course        Procedures       Critical Care time:  none          No results found for this or any previous visit (from the past 24 hour(s)).  Medications - No data to display    Assessments & Plan (with Medical Decision Making)     I have reviewed the nursing notes.  I have reviewed the findings, diagnosis, plan and need  for follow up with the patient.       New Prescriptions    PREDNISONE (DELTASONE) 20 MG TABLET    Take two tablets (= 40mg) each day for 5 (five) days     Final diagnoses:   Bee sting reaction     21-year-old female presents to urgent care with concern over left forearm erythema, pain, swelling, pruritus after sustaining bee sting yesterday.  Physical exam findings appear most consistent with histamine mediated reaction.  I do not suspect cellulitis, erythema migrans at this time.  Patient was discharged home stable with instructions for continued symptomatic treatment with rest, ice, OTC antihistamines, topical hydrocortisone.  I did agree to provide prescription or prednisone to be started only if no improvement in the next several days.  Worrisome reasons to return to ER/UC sooner discussed     Disclaimer: This note consists of symbols derived from keyboarding, dictation, and/or voice recognition software. As a result, there may be errors in the script that have gone undetected.  Please consider this when interpreting information found in the chart.      8/24/2021   [facility] EMERGENCY DEPT     Dedra Hoffmann PA-C  08/26/21 [id number]

## 2021-09-18 ENCOUNTER — HEALTH MAINTENANCE LETTER (OUTPATIENT)
Age: 21
End: 2021-09-18

## 2021-09-21 ENCOUNTER — MYC MEDICAL ADVICE (OUTPATIENT)
Dept: FAMILY MEDICINE | Facility: CLINIC | Age: 21
End: 2021-09-21

## 2021-09-21 ENCOUNTER — OFFICE VISIT (OUTPATIENT)
Dept: FAMILY MEDICINE | Facility: CLINIC | Age: 21
End: 2021-09-21
Payer: COMMERCIAL

## 2021-09-21 VITALS
RESPIRATION RATE: 16 BRPM | BODY MASS INDEX: 27.38 KG/M2 | HEIGHT: 61 IN | DIASTOLIC BLOOD PRESSURE: 64 MMHG | OXYGEN SATURATION: 98 % | SYSTOLIC BLOOD PRESSURE: 102 MMHG | HEART RATE: 87 BPM | TEMPERATURE: 98.6 F | WEIGHT: 145 LBS

## 2021-09-21 DIAGNOSIS — R10.84 ABDOMINAL PAIN, GENERALIZED: Primary | ICD-10-CM

## 2021-09-21 DIAGNOSIS — R11.2 NON-INTRACTABLE VOMITING WITH NAUSEA, UNSPECIFIED VOMITING TYPE: ICD-10-CM

## 2021-09-21 PROCEDURE — 99214 OFFICE O/P EST MOD 30 MIN: CPT | Performed by: NURSE PRACTITIONER

## 2021-09-21 RX ORDER — ONDANSETRON 8 MG/1
8 TABLET, FILM COATED ORAL EVERY 8 HOURS PRN
Qty: 14 TABLET | Refills: 0 | Status: SHIPPED | OUTPATIENT
Start: 2021-09-21 | End: 2023-05-02

## 2021-09-21 RX ORDER — FAMOTIDINE 40 MG/1
40 TABLET, FILM COATED ORAL
Qty: 30 TABLET | Refills: 0 | Status: SHIPPED | OUTPATIENT
Start: 2021-09-21 | End: 2023-05-02

## 2021-09-21 ASSESSMENT — PAIN SCALES - GENERAL: PAINLEVEL: SEVERE PAIN (6)

## 2021-09-21 ASSESSMENT — MIFFLIN-ST. JEOR: SCORE: 1355.1

## 2021-09-21 NOTE — LETTER
[facility]  [address] [address]  Phone: [phone]    September 21, 2021        Loni Neumann  [address] [address] [address]          To whom it may concern:    RE: Loni Bairesre    Patient was seen and treated today at our clinic and missed work 9/20/2021 and 9/21/2021      Please contact me for questions or concerns.      Sincerely,        Kavya Mckeon NP

## 2021-09-21 NOTE — PROGRESS NOTES
"    Assessment & Plan     Abdominal pain, generalized  Uncertain etiology - likely viral cause - discussed red flags and reasons to Return to clinic or ED.  Treat for possible gastritis and nausea.    - famotidine (PEPCID) 40 MG tablet  Dispense: 30 tablet; Refill: 0  - ondansetron (ZOFRAN) 8 MG tablet  Dispense: 14 tablet; Refill: 0  - CBC with platelets and differential  - Comprehensive metabolic panel (BMP + Alb, Alk Phos, ALT, AST, Total. Bili, TP)    Non-intractable vomiting with nausea, unspecified vomiting type     - famotidine (PEPCID) 40 MG tablet  Dispense: 30 tablet; Refill: 0  - ondansetron (ZOFRAN) 8 MG tablet  Dispense: 14 tablet; Refill: 0  - CBC with platelets and differential  - Comprehensive metabolic panel (BMP + Alb, Alk Phos, ALT, AST, Total. Bili, TP)            Patient Instructions     Patient Education     Viral Gastroenteritis (Adult)    Gastroenteritis is commonly called the \"stomach flu,\" although it has nothing to do with influenza. It is most often caused by a virus that affects the stomach and intestinal tract and usually lasts from 2 to 7 days. Common viruses causing gastroenteritis include norovirus, rotavirus, and hepatitis A. Non-viral causes of gastroenteritis include bacteria, parasites, and toxins.  The danger from repeated vomiting or diarrhea is dehydration. This is the loss of too much fluid from the body. When this occurs, body fluids must be replaced. Antibiotics don't help with this illness because it is usually viral. Simple home treatment will be helpful.  Symptoms of viral gastroenteritis may include:    Watery, loose stools    Stomach pain or abdominal cramps    Fever and chills    Nausea and vomiting    Loss of bowel control    Headache  Home care  Gastroenteritis is transmitted by contact with the stool or vomit of an infected person. This can occur from person to person or from contact with a contaminated surface.  Follow these guidelines when caring for yourself at " home:    If symptoms are severe, rest at home for the next 24 hours or until you are feeling better.    Wash your hands with soap and water or use alcohol-based  to prevent the spread of infection. Wash your hands after touching anyone who is sick.    Wash your hands or use alcohol-based  after using the toilet and before meals. Clean the toilet after each use.  Remember these tips when preparing food:    People with diarrhea should not prepare or serve food to others. When preparing foods, wash your hands before and after.    Wash your hands after using cutting boards, countertops, knives, or utensils that have been in contact with raw food.    Dry your hands with a single use towel.    Keep uncooked meats away from cooked and ready-to-eat foods.  Medicine  You may use acetaminophen or NSAID medicines like ibuprofen or naproxen to control fever unless another medicine was given. If you have chronic liver or kidney disease, talk with your healthcare provider before using these medicines. Also talk with your provider if you've had a stomach ulcer or gastrointestinal bleeding. Don't give aspirin to anyone under 18 years of age who is ill with a fever. It may cause severe liver damage. Don't use NSAIDS is you are already taking one for another condition (like arthritis) or are on aspirin (such as for heart disease or after a stroke).  If medicine for vomiting or diarrhea are prescribed, take these only as directed. Nausea and diarrhea medicines are generally OK unless you have bleeding, fever, or severe abdominal pain.  Diet  Follow these guidelines for food:    Water and liquids are important so you don't get dehydrated. Drink a small amount at a time or suck on ice chips if you are vomiting.    If you eat, avoid fatty, greasy, spicy, or fried foods.    Don't eat dairy if you have diarrhea. This can make diarrhea worse.    Avoid tobacco, alcohol, and caffeine which may worsen symptoms.  During the  first 24 hours (the first full day), follow the diet below:    Beverages. Sports drinks, soft drinks without caffeine, ginger ale, mineral water (plain or flavored), decaffeinated tea and coffee. If you are very dehydrated, sports drinks aren't a good choice. They have too much sugar and not enough electrolytes. In this case, commercially available products called oral rehydration solutions, are best.    Soups. Eat clear broth, consommé, and bouillon.    Desserts. Eat gelatin, ice pops, and fruit juice bars.  During the next 24 hours (the second day), you may add the following to the above:    Hot cereal, plain toast, bread, rolls, and crackers    Plain noodles, rice, mashed potatoes, chicken noodle or rice soup    Unsweetened canned fruit (avoid pineapple), bananas    Limit fat intake to less than 15 grams per day. Do this by avoiding margarine, butter, oils, mayonnaise, sauces, gravies, fried foods, peanut butter, meat, poultry, and fish.    Limit fiber and avoid raw or cooked vegetables, fresh fruits (except bananas), and bran cereals.    Limit caffeine and chocolate. Don't use spices or seasonings other than salt.    Limit dairy products.    Avoid alcohol.  During the next 24 hours:    Gradually resume a normal diet as you feel better and your symptoms improve.    If at any time it starts getting worse again, go back to clear liquids until you feel better.  Follow-up care  Follow up with your healthcare provider, or as advised. Call your provider if you don't get better within 24 hours or if diarrhea lasts more than a week. Also follow up if you are unable to keep down liquids and get dehydrated. If a stool (diarrhea) sample was taken, call as directed for the results.  Call 911  Call 911 if any of these occur:    Trouble breathing    Chest pain    Confused    Severe drowsiness or trouble awakening    Fainting or loss of consciousness    Rapid heart rate    Seizure    Stiff neck  When to seek medical  advice  Call your healthcare provider right away if any of these occur:    Abdominal pain that gets worse    Continued vomiting (unable to keep liquids down)    Frequent diarrhea (more than 5 times a day)    Blood in vomit or stool (black or red color)    Dark urine, reduced urine output, or extreme thirst    Weakness or dizziness    Drowsiness    Fever of 100.4 F (38 C) or higher, or as directed by your healthcare provider    New rash  [employer] last reviewed this educational content on 6/1/2018 2000-2021 [employer]. All rights reserved. This information is not intended as a substitute for professional medical care. Always follow your healthcare professional's instructions.               Return if symptoms worsen or fail to improve, for Recheck symptoms.    Kavya Mckeon NP  [facility] SHIRAZ Ivey is a 21 year old who presents for the following health issues     HPI     Abdominal/Flank Pain  Onset/Duration: x7days  Description:   Character: Sharp and Dull ache  Location: left upper quadrant  Radiation: Back  Intensity: 6/10  Progression of Symptoms:  same and constant  Accompanying Signs & Symptoms:  Fever/Chills: no  Gas/Bloating: no  Nausea: YES  Vomitting: YES  Diarrhea: YES  Constipation: no  Dysuria or Hematuria: no  History:   Trauma: no  Previous similar pain: no  Previous tests done: none  Precipitating factors:   Does the pain change with:     Food: no    Bowel Movement: YES- worse    Urination: no   Other factors:  no  Therapies tried and outcome: None  Patient's last menstrual period was 08/20/2021 (approximate).    Started with nausea/vomiting - this has improved some now just with nausea and diarrhea (2-3 x daily).  Diarrhea reported as watery.  Epigastric pain.  Grandma and mom have similar symptoms.    Denies fevers, chills.  Reports some muscle/body aches.    Review of Systems   Constitutional, HEENT, cardiovascular, pulmonary, GI, ,  "musculoskeletal, neuro, skin, endocrine and psych systems are negative, except as otherwise noted.      Objective    /64 (BP Location: Left arm, Patient Position: Sitting, Cuff Size: Adult Regular)   Pulse 87   Temp 98.6  F (37  C) (Tympanic)   Resp 16   Ht 1.549 m (5' 1\")   Wt 65.8 kg (145 lb)   LMP 08/20/2021 (Approximate)   SpO2 98%   Breastfeeding No   BMI 27.40 kg/m    Body mass index is 27.4 kg/m .  Physical Exam   GENERAL: healthy, alert and no distress  NECK: no adenopathy, no asymmetry, masses, or scars and thyroid normal to palpation  RESP: lungs clear to auscultation - no rales, rhonchi or wheezes  CV: regular rate and rhythm, normal S1 S2, no S3 or S4, no murmur, click or rub, no peripheral edema and peripheral pulses strong  ABDOMEN: tenderness epigastric, no organomegaly or masses and bowel sounds normal  MS: no gross musculoskeletal defects noted, no edema         "

## 2021-09-21 NOTE — PATIENT INSTRUCTIONS
"  Patient Education     Viral Gastroenteritis (Adult)    Gastroenteritis is commonly called the \"stomach flu,\" although it has nothing to do with influenza. It is most often caused by a virus that affects the stomach and intestinal tract and usually lasts from 2 to 7 days. Common viruses causing gastroenteritis include norovirus, rotavirus, and hepatitis A. Non-viral causes of gastroenteritis include bacteria, parasites, and toxins.  The danger from repeated vomiting or diarrhea is dehydration. This is the loss of too much fluid from the body. When this occurs, body fluids must be replaced. Antibiotics don't help with this illness because it is usually viral. Simple home treatment will be helpful.  Symptoms of viral gastroenteritis may include:    Watery, loose stools    Stomach pain or abdominal cramps    Fever and chills    Nausea and vomiting    Loss of bowel control    Headache  Home care  Gastroenteritis is transmitted by contact with the stool or vomit of an infected person. This can occur from person to person or from contact with a contaminated surface.  Follow these guidelines when caring for yourself at home:    If symptoms are severe, rest at home for the next 24 hours or until you are feeling better.    Wash your hands with soap and water or use alcohol-based  to prevent the spread of infection. Wash your hands after touching anyone who is sick.    Wash your hands or use alcohol-based  after using the toilet and before meals. Clean the toilet after each use.  Remember these tips when preparing food:    People with diarrhea should not prepare or serve food to others. When preparing foods, wash your hands before and after.    Wash your hands after using cutting boards, countertops, knives, or utensils that have been in contact with raw food.    Dry your hands with a single use towel.    Keep uncooked meats away from cooked and ready-to-eat foods.  Medicine  You may use acetaminophen or " NSAID medicines like ibuprofen or naproxen to control fever unless another medicine was given. If you have chronic liver or kidney disease, talk with your healthcare provider before using these medicines. Also talk with your provider if you've had a stomach ulcer or gastrointestinal bleeding. Don't give aspirin to anyone under 18 years of age who is ill with a fever. It may cause severe liver damage. Don't use NSAIDS is you are already taking one for another condition (like arthritis) or are on aspirin (such as for heart disease or after a stroke).  If medicine for vomiting or diarrhea are prescribed, take these only as directed. Nausea and diarrhea medicines are generally OK unless you have bleeding, fever, or severe abdominal pain.  Diet  Follow these guidelines for food:    Water and liquids are important so you don't get dehydrated. Drink a small amount at a time or suck on ice chips if you are vomiting.    If you eat, avoid fatty, greasy, spicy, or fried foods.    Don't eat dairy if you have diarrhea. This can make diarrhea worse.    Avoid tobacco, alcohol, and caffeine which may worsen symptoms.  During the first 24 hours (the first full day), follow the diet below:    Beverages. Sports drinks, soft drinks without caffeine, ginger ale, mineral water (plain or flavored), decaffeinated tea and coffee. If you are very dehydrated, sports drinks aren't a good choice. They have too much sugar and not enough electrolytes. In this case, commercially available products called oral rehydration solutions, are best.    Soups. Eat clear broth, consommé, and bouillon.    Desserts. Eat gelatin, ice pops, and fruit juice bars.  During the next 24 hours (the second day), you may add the following to the above:    Hot cereal, plain toast, bread, rolls, and crackers    Plain noodles, rice, mashed potatoes, chicken noodle or rice soup    Unsweetened canned fruit (avoid pineapple), bananas    Limit fat intake to less than 15 grams  per day. Do this by avoiding margarine, butter, oils, mayonnaise, sauces, gravies, fried foods, peanut butter, meat, poultry, and fish.    Limit fiber and avoid raw or cooked vegetables, fresh fruits (except bananas), and bran cereals.    Limit caffeine and chocolate. Don't use spices or seasonings other than salt.    Limit dairy products.    Avoid alcohol.  During the next 24 hours:    Gradually resume a normal diet as you feel better and your symptoms improve.    If at any time it starts getting worse again, go back to clear liquids until you feel better.  Follow-up care  Follow up with your healthcare provider, or as advised. Call your provider if you don't get better within 24 hours or if diarrhea lasts more than a week. Also follow up if you are unable to keep down liquids and get dehydrated. If a stool (diarrhea) sample was taken, call as directed for the results.  Call 911  Call 911 if any of these occur:    Trouble breathing    Chest pain    Confused    Severe drowsiness or trouble awakening    Fainting or loss of consciousness    Rapid heart rate    Seizure    Stiff neck  When to seek medical advice  Call your healthcare provider right away if any of these occur:    Abdominal pain that gets worse    Continued vomiting (unable to keep liquids down)    Frequent diarrhea (more than 5 times a day)    Blood in vomit or stool (black or red color)    Dark urine, reduced urine output, or extreme thirst    Weakness or dizziness    Drowsiness    Fever of 100.4 F (38 C) or higher, or as directed by your healthcare provider    New rash  [employer]Well last reviewed this educational content on 6/1/2018 2000-2021 [employer]. All rights reserved. This information is not intended as a substitute for professional medical care. Always follow your healthcare professional's instructions.

## 2021-10-19 PROBLEM — F32.9 MAJOR DEPRESSION: Status: ACTIVE | Noted: 2018-04-27

## 2021-11-08 ENCOUNTER — ALLIED HEALTH/NURSE VISIT (OUTPATIENT)
Dept: FAMILY MEDICINE | Facility: CLINIC | Age: 21
End: 2021-11-08
Payer: COMMERCIAL

## 2021-11-08 VITALS — WEIGHT: 137.7 LBS | DIASTOLIC BLOOD PRESSURE: 62 MMHG | SYSTOLIC BLOOD PRESSURE: 110 MMHG | BODY MASS INDEX: 26.02 KG/M2

## 2021-11-08 DIAGNOSIS — Z30.9 CONTRACEPTIVE MANAGEMENT: Primary | ICD-10-CM

## 2021-11-08 PROCEDURE — 96372 THER/PROPH/DIAG INJ SC/IM: CPT | Performed by: NURSE PRACTITIONER

## 2021-11-08 PROCEDURE — 99207 PR NO CHARGE NURSE ONLY: CPT

## 2021-11-08 RX ADMIN — MEDROXYPROGESTERONE ACETATE 150 MG: 150 INJECTION, SUSPENSION INTRAMUSCULAR at 16:40

## 2021-11-08 NOTE — PROGRESS NOTES
Clinic Administered Medication Documentation    Administrations This Visit     medroxyPROGESTERone (DEPO-PROVERA) injection 150 mg     Admin Date  11/08/2021 Action  Given Dose  150 mg Route  Intramuscular Site  Right Ventrogluteal Administered By  Paulo Whitfield CMA    Ordering Provider: Lamar Rascon APRN CNP    Patient Supplied?: No                  Depo Provera Documentation    URINE HCG: not indicated    Depo-Provera Standing Order inclusion/exclusion criteria reviewed.   Patient meets: inclusion criteria     BP: 110/62  LAST PAP/EXAM:   Lab Results   Component Value Date    PAP NIL 11/05/2020       Prior to injection, verified patient identity using patient's name and date of birth. Medication was administered. Please see MAR and medication order for additional information.     Was entire vial of medication used? Yes  Vial/Syringe: Single dose vial  Expiration Date:  4/2023    Patient instructed to report any adverse reaction to staff immediately .  NEXT INJECTION DUE: 1/24/22 - 2/7/22    Paulo WHITMORE CMA

## 2021-12-06 ENCOUNTER — MYC MEDICAL ADVICE (OUTPATIENT)
Dept: FAMILY MEDICINE | Facility: CLINIC | Age: 21
End: 2021-12-06
Payer: COMMERCIAL

## 2022-01-08 ENCOUNTER — HEALTH MAINTENANCE LETTER (OUTPATIENT)
Age: 22
End: 2022-01-08

## 2022-02-19 ENCOUNTER — E-VISIT (OUTPATIENT)
Dept: FAMILY MEDICINE | Facility: CLINIC | Age: 22
End: 2022-02-19
Payer: COMMERCIAL

## 2022-02-19 DIAGNOSIS — F33.1 MAJOR DEPRESSIVE DISORDER, RECURRENT EPISODE, MODERATE (H): Primary | ICD-10-CM

## 2022-02-19 DIAGNOSIS — F32.1 MODERATE MAJOR DEPRESSION (H): ICD-10-CM

## 2022-02-19 DIAGNOSIS — F41.1 GAD (GENERALIZED ANXIETY DISORDER): ICD-10-CM

## 2022-02-19 PROCEDURE — 99421 OL DIG E/M SVC 5-10 MIN: CPT | Performed by: NURSE PRACTITIONER

## 2022-02-19 ASSESSMENT — ANXIETY QUESTIONNAIRES
2. NOT BEING ABLE TO STOP OR CONTROL WORRYING: SEVERAL DAYS
GAD7 TOTAL SCORE: 14
1. FEELING NERVOUS, ANXIOUS, OR ON EDGE: MORE THAN HALF THE DAYS
7. FEELING AFRAID AS IF SOMETHING AWFUL MIGHT HAPPEN: MORE THAN HALF THE DAYS
6. BECOMING EASILY ANNOYED OR IRRITABLE: MORE THAN HALF THE DAYS
8. IF YOU CHECKED OFF ANY PROBLEMS, HOW DIFFICULT HAVE THESE MADE IT FOR YOU TO DO YOUR WORK, TAKE CARE OF THINGS AT HOME, OR GET ALONG WITH OTHER PEOPLE?: VERY DIFFICULT
GAD7 TOTAL SCORE: 14
3. WORRYING TOO MUCH ABOUT DIFFERENT THINGS: MORE THAN HALF THE DAYS
7. FEELING AFRAID AS IF SOMETHING AWFUL MIGHT HAPPEN: MORE THAN HALF THE DAYS
5. BEING SO RESTLESS THAT IT IS HARD TO SIT STILL: NEARLY EVERY DAY
4. TROUBLE RELAXING: MORE THAN HALF THE DAYS
GAD7 TOTAL SCORE: 14

## 2022-02-19 ASSESSMENT — PATIENT HEALTH QUESTIONNAIRE - PHQ9
10. IF YOU CHECKED OFF ANY PROBLEMS, HOW DIFFICULT HAVE THESE PROBLEMS MADE IT FOR YOU TO DO YOUR WORK, TAKE CARE OF THINGS AT HOME, OR GET ALONG WITH OTHER PEOPLE: VERY DIFFICULT
SUM OF ALL RESPONSES TO PHQ QUESTIONS 1-9: 14
SUM OF ALL RESPONSES TO PHQ QUESTIONS 1-9: 14

## 2022-02-20 ASSESSMENT — ANXIETY QUESTIONNAIRES: GAD7 TOTAL SCORE: 14

## 2022-02-20 ASSESSMENT — PATIENT HEALTH QUESTIONNAIRE - PHQ9: SUM OF ALL RESPONSES TO PHQ QUESTIONS 1-9: 14

## 2022-02-21 NOTE — PATIENT INSTRUCTIONS
"Start Fluoxetine at 20 mg once daily - follow up with me in 4-6 weeks to discuss adjusting dose at that time if needed.    Follow up sooner if problems arise.    PRIMITIVO Hackett    Crisis phone numbers:    [facility] ([facility]): [phone]  [facility]: [phone]  [facility]: [phone]  [address] [facility] ([place], [place], [place], [place] and [facility]) [phone]  [facility]: [phone]      Warning Signs of Suicide and What You Can Do  If you think a person could be suicidal, ask, \"Have you thought about suicide?\" Asking won't make it more likely that they will try to hurt themselves. In fact, most people with suicidal thoughts say they are relieved when the question is asked.   If they say yes, they may already have a plan for how and when they will attempt it. Find out as much as you can. The more detailed the plan, and the easier it is to carry out, the more danger the person is in right now.     Know the warning signs  The warning signs for suicide include:    Threats or talk of suicide    Talking about death and dying    Changing eating or sleeping habits (for instance, not sleeping or sleeping all of the time)    Feeling hopeless    Suddenly buying a gun or other weapon    Saying things such as \"Soon, I won't be a problem\" or \"Nothing matters\"    Giving away things they own, making out a will, or planning their     Suddenly being happy or calm after being depressed  Things that put a person at a higher risk of attempting suicide include:     A history of suicide in the person's family    Past suicide attempts    Alcohol and drug use, along with impulsive behaviors    Having a diagnosed mood disorder such as depression or bipolar disorder    History of trauma or abuse including bullying    Major losses such as a divorce, death of a loved one, money problems, or legal problems    Having access to a lethal weapon (such as guns in the " home)    Long-term (chronic) physical illnesses, including chronic pain    Being around others with suicidal behavior  Get help  Don't try to handle this alone. You can be the most help by getting the person to a trained professional. Suicidal thinking may be a sign of depression. This is a serious but treatable illness.   In an emergency--call 911  Don't leave the person alone. Anyone who is at imminent risk of suicide needs psychiatric services right away. The person must be constantly watched, and never left out of sight. Call 911 or a 24-hour suicide crisis hotline. You can search for this online. You can also take the person to the nearest hospital emergency room.   Don't keep it a secret and don't wait  Call a mental health clinic or a licensed mental health professional in your area right away. This may be a psychiatrist, clinical psychologist, psychiatric or licensed clinical , marriage and family counselor, or clergy. If you don't know how to contact such professionals and there is an immediate risk, call 911. Tell them you need help for a person who is thinking about suicide.   Resources    National Suicide Prevention Cjdvtabc322-067-5489 (636-247-VYUO)www.suicidepreventionlifeline.org    National Suicide Meigucs629-234-9914 (800-SUICIDE)    National [place] of Mental Uwtdwv138-505-8543tig.nimh.nih.gov    National [place] on Mental Iextudw334-348-4656hwn.jeff.org    Mental Health Laicwzm420-093-6611nzb.[facility].org    Levi last reviewed this educational content on 1/1/2020 2000-2021 [employer]. All rights reserved. This information is not intended as a substitute for professional medical care. Always follow your healthcare professional's instructions.          Depression: Tips to Help Yourself    As your healthcare providers help treat your depression, you can also help yourself. Keep in mind that your illness affects you emotionally, physically, mentally, and socially. So  full recovery will take time. Take care of your body and your soul, and be patient with yourself as you get better.  Self-care    Educate yourself. Read about treatment and medicine options. If you have the energy, attend local conferences or support groups. Keep a list of useful websites and helpful books and use them as needed. This illness is not your fault. Don t blame yourself for your depression.    Manage early symptoms. If you notice symptoms returning, experience triggers, or identify other factors that may lead to a depressive episode, get help as soon as possible. Ask trusted friends and family to monitor your behavior and let you know if they see anything of concern.    Work with your provider. Find a provider you can trust. Communicate honestly with that person and share information on your treatment for depression and your reaction to medicines.    Be prepared for a crisis. Know what to do if you experience a crisis. Keep the phone number of a crisis hotline and know the location of your community's urgent care centers and the closest emergency department.    Hold off on big decisions. Depression can cloud your judgment. So wait until you feel better before making major life decisions, such as changing jobs, moving, or getting  or .    Be patient. Recovering from depression is a process. Don t be discouraged if it takes some time to feel better.    Keep it simple. Depression saps your energy and concentration. So you won t be able to do all the things you used to do. Set small goals and do what you can.    Be with others. Don t isolate yourself--you ll only feel worse. Try to be with other people. And take part in fun activities when you can. Go to a movie, ballgame, Shinto service, or social event. Talk openly with people you can trust. And accept help when it s offered.    Take care of your body  People with depression often lose the desire to take care of themselves. That only  makes their problems worse. During treatment and afterward, make a point to:    Exercise. It s a great way to take care of your body. And studies have shown that exercise helps fight depression. Aim for 30 minutes of moderate activity a day. Walking in small blocks of time (5-10 minutes) is a good way to start, but anything that gets you moving (gardening, house cleaning) counts.    Don't use drugs and alcohol. These may ease the pain in the short term. But they ll only make your problems worse in the long run.    Get relief from stress. Ask your healthcare provider for relaxation exercises and techniques to help relieve stress. Consider activities like meditation, yoga, or Jeffrey Chi.    Eat right. A balanced and healthy diet helps keep your body healthy.    Get adequate sleep. Aim for 8 hours per night. Too much or too little sleep can cause other physical and emotional problems.  Levi last reviewed this educational content on 12/1/2019 2000-2021 [employer]. All rights reserved. This information is not intended as a substitute for professional medical care. Always follow your healthcare professional's instructions.

## 2022-11-19 ENCOUNTER — HEALTH MAINTENANCE LETTER (OUTPATIENT)
Age: 22
End: 2022-11-19

## 2023-04-03 ENCOUNTER — OFFICE VISIT (OUTPATIENT)
Dept: FAMILY MEDICINE | Facility: CLINIC | Age: 23
End: 2023-04-03
Payer: COMMERCIAL

## 2023-04-03 VITALS
WEIGHT: 134 LBS | RESPIRATION RATE: 16 BRPM | DIASTOLIC BLOOD PRESSURE: 70 MMHG | SYSTOLIC BLOOD PRESSURE: 126 MMHG | HEIGHT: 62 IN | OXYGEN SATURATION: 98 % | HEART RATE: 100 BPM | TEMPERATURE: 98.3 F | BODY MASS INDEX: 24.66 KG/M2

## 2023-04-03 DIAGNOSIS — R07.0 THROAT PAIN: Primary | ICD-10-CM

## 2023-04-03 DIAGNOSIS — Z20.818 STREPTOCOCCUS EXPOSURE: ICD-10-CM

## 2023-04-03 LAB
DEPRECATED S PYO AG THROAT QL EIA: NEGATIVE
GROUP A STREP BY PCR: NOT DETECTED

## 2023-04-03 PROCEDURE — 87651 STREP A DNA AMP PROBE: CPT | Performed by: STUDENT IN AN ORGANIZED HEALTH CARE EDUCATION/TRAINING PROGRAM

## 2023-04-03 PROCEDURE — 99214 OFFICE O/P EST MOD 30 MIN: CPT | Performed by: STUDENT IN AN ORGANIZED HEALTH CARE EDUCATION/TRAINING PROGRAM

## 2023-04-03 RX ORDER — AZITHROMYCIN 250 MG/1
TABLET, FILM COATED ORAL
Qty: 6 TABLET | Refills: 0 | Status: SHIPPED | OUTPATIENT
Start: 2023-04-03 | End: 2023-04-04

## 2023-04-03 RX ORDER — AMOXICILLIN 500 MG/1
500 CAPSULE ORAL 2 TIMES DAILY
Qty: 20 CAPSULE | Refills: 0 | Status: SHIPPED | OUTPATIENT
Start: 2023-04-03 | End: 2023-04-03

## 2023-04-03 ASSESSMENT — PAIN SCALES - GENERAL: PAINLEVEL: NO PAIN (0)

## 2023-04-03 NOTE — PROGRESS NOTES
"  1. Throat pain  2. Streptococcus exposure  > multiple coworkers at her  have gotten strep throat and patient presents with throat discomfort   - Streptococcus A Rapid Screen w/Reflex to PCR - Clinic Collect  - will send prescription for azithromycin 500mg on day 1 followed by 250mg days 2-5   - patient aware to hold off on antibiotics unless her test for Strep comes back positive   - discussed conservative/supportive care treatment options in the event that her test comes back negative, I suspect she has a viral infection     Kiara De Guzman MD     Addendum:   Rapid strep results came back negative, will await results of PCR, if those are negative, will then cancel prescription for azithromycin   PCR results were negative, will cancel prescription sent for antibiotic     Subjective   Loni is a 22 year old, presenting for the following health issues:  Pharyngitis        4/3/2023    10:04 AM   Additional Questions   Roomed by Theresa ESPOSITO LPN   Accompanied by self     HPI     Acute Illness  Acute illness concerns: possible strep  Onset/Duration: Friday night  Symptoms:  Fever: No  Chills/Sweats: No  Headache (location?): YES  Sinus Pressure: YES  Conjunctivitis:  No  Ear Pain: no  Rhinorrhea: YES  Congestion: YES  Sore Throat: YES  Cough: YES-non-productive  Wheeze: No  Decreased Appetite: YES- not as hungry  Nausea: YES  Vomiting: No  Diarrhea: No  Fatigue/Achiness: YES  Sick/Strep Exposure: YES- has been going around at work  Therapies tried and outcome: mucinex and dayquil pills - mucinex kind of helped but didn't notice an effect with the dayquil      Review of Systems   As above       Objective    /70 (BP Location: Right arm, Patient Position: Sitting, Cuff Size: Adult Regular)   Pulse 100   Temp 98.3  F (36.8  C) (Tympanic)   Resp 16   Ht 1.571 m (5' 1.85\")   Wt 60.8 kg (134 lb)   LMP 03/07/2023 (Exact Date)   SpO2 98%   BMI 24.63 kg/m    Body mass index is 24.63 kg/m .  Physical " Exam  Constitutional:       General: She is not in acute distress.     Appearance: Normal appearance.   HENT:      Head: Normocephalic and atraumatic.      Right Ear: Tympanic membrane, ear canal and external ear normal. There is no impacted cerumen.      Left Ear: Tympanic membrane, ear canal and external ear normal. There is no impacted cerumen.      Mouth/Throat:      Mouth: Mucous membranes are moist.      Pharynx: Oropharynx is clear. No oropharyngeal exudate.      Comments: Enlarged tonsils bilaterally   Eyes:      General: No scleral icterus.        Right eye: No discharge.         Left eye: No discharge.      Extraocular Movements: Extraocular movements intact.      Conjunctiva/sclera: Conjunctivae normal.   Pulmonary:      Effort: Pulmonary effort is normal. No respiratory distress.   Musculoskeletal:         General: No deformity. Normal range of motion.      Cervical back: Normal range of motion and neck supple.   Skin:     General: Skin is warm.      Comments: No rash on exposed skin   Neurological:      General: No focal deficit present.      Mental Status: She is alert and oriented to person, place, and time.   Psychiatric:         Mood and Affect: Mood normal.         Behavior: Behavior normal.

## 2023-04-04 NOTE — RESULT ENCOUNTER NOTE
Dear Loni,     Here are your recent Strep results which are negative, meaning you likely do not have strep throat. Please do not take the antibiotics prescribed to you and continue with your current plan of care and let us know if you have any questions or concerns.    Regards,  SONA ROY MD

## 2023-04-09 ENCOUNTER — HEALTH MAINTENANCE LETTER (OUTPATIENT)
Age: 23
End: 2023-04-09

## 2023-05-02 ENCOUNTER — OFFICE VISIT (OUTPATIENT)
Dept: FAMILY MEDICINE | Facility: CLINIC | Age: 23
End: 2023-05-02
Payer: COMMERCIAL

## 2023-05-02 VITALS
TEMPERATURE: 97.5 F | WEIGHT: 132.44 LBS | RESPIRATION RATE: 18 BRPM | HEIGHT: 62 IN | HEART RATE: 102 BPM | DIASTOLIC BLOOD PRESSURE: 64 MMHG | SYSTOLIC BLOOD PRESSURE: 104 MMHG | BODY MASS INDEX: 24.37 KG/M2 | OXYGEN SATURATION: 98 %

## 2023-05-02 DIAGNOSIS — F32.1 MODERATE MAJOR DEPRESSION (H): ICD-10-CM

## 2023-05-02 DIAGNOSIS — F41.1 GAD (GENERALIZED ANXIETY DISORDER): ICD-10-CM

## 2023-05-02 DIAGNOSIS — Z00.00 ROUTINE HISTORY AND PHYSICAL EXAMINATION OF ADULT: Primary | ICD-10-CM

## 2023-05-02 DIAGNOSIS — F12.20 CANNABIS DEPENDENCE (H): ICD-10-CM

## 2023-05-02 DIAGNOSIS — Z30.011 ENCOUNTER FOR INITIAL PRESCRIPTION OF CONTRACEPTIVE PILLS: ICD-10-CM

## 2023-05-02 DIAGNOSIS — Z11.3 SCREENING FOR STDS (SEXUALLY TRANSMITTED DISEASES): ICD-10-CM

## 2023-05-02 PROCEDURE — 99395 PREV VISIT EST AGE 18-39: CPT | Performed by: NURSE PRACTITIONER

## 2023-05-02 PROCEDURE — 99214 OFFICE O/P EST MOD 30 MIN: CPT | Mod: 25 | Performed by: NURSE PRACTITIONER

## 2023-05-02 ASSESSMENT — ENCOUNTER SYMPTOMS
ABDOMINAL PAIN: 0
CONSTIPATION: 0
MYALGIAS: 0
FREQUENCY: 0
DIARRHEA: 0
FEVER: 0
BREAST MASS: 0
COUGH: 0
JOINT SWELLING: 0
EYE PAIN: 0
HEADACHES: 0
WEAKNESS: 0
PALPITATIONS: 0
HEMATURIA: 0
NERVOUS/ANXIOUS: 0
NAUSEA: 0
SHORTNESS OF BREATH: 0
CHILLS: 0
ARTHRALGIAS: 0
HEMATOCHEZIA: 0
DIZZINESS: 0
DYSURIA: 0
PARESTHESIAS: 0
HEARTBURN: 0
SORE THROAT: 0

## 2023-05-02 ASSESSMENT — PATIENT HEALTH QUESTIONNAIRE - PHQ9
SUM OF ALL RESPONSES TO PHQ QUESTIONS 1-9: 10
SUM OF ALL RESPONSES TO PHQ QUESTIONS 1-9: 10
10. IF YOU CHECKED OFF ANY PROBLEMS, HOW DIFFICULT HAVE THESE PROBLEMS MADE IT FOR YOU TO DO YOUR WORK, TAKE CARE OF THINGS AT HOME, OR GET ALONG WITH OTHER PEOPLE: VERY DIFFICULT

## 2023-05-02 ASSESSMENT — PAIN SCALES - GENERAL: PAINLEVEL: NO PAIN (0)

## 2023-05-02 NOTE — PROGRESS NOTES
SUBJECTIVE:   CC: Loni is an 22 year old who presents for preventive health visit.       5/2/2023     2:18 PM   Additional Questions   Roomed by Sondra Kay CMA     Patient has been advised of split billing requirements and indicates understanding: Yes  Healthy Habits:     Getting at least 3 servings of Calcium per day:  NO    Bi-annual eye exam:  NO    Dental care twice a year:  Yes    Sleep apnea or symptoms of sleep apnea:  None    Diet:  Regular (no restrictions)    Frequency of exercise:  1 day/week    Duration of exercise:  15-30 minutes    Taking medications regularly:  Yes    Medication side effects:  Not applicable    PHQ-2 Total Score: 3    Additional concerns today:  No    Ability to successfully perform activities of daily living: Yes, no assistance needed  Home safety:  none identified          Depression and Anxiety Follow-Up    How are you doing with your depression since your last visit? Worsened with depressive symptoms    How are you doing with your anxiety since your last visit?  No change    Are you having other symptoms that might be associated with depression or anxiety? No    Have you had a significant life event? No     Do you have any concerns with your use of alcohol or other drugs? No    Social History     Tobacco Use     Smoking status: Former     Packs/day: 0.00     Types: Cigarettes     Smokeless tobacco: Never   Vaping Use     Vaping status: Former   Substance Use Topics     Alcohol use: No     Alcohol/week: 0.0 standard drinks of alcohol     Drug use: Yes     Types: Marijuana     Comment: 2-3 times per month         5/7/2021     1:44 PM 2/19/2022     8:16 PM 5/2/2023     2:13 PM   PHQ   PHQ-9 Total Score 3 14 10   Q9: Thoughts of better off dead/self-harm past 2 weeks Not at all Several days Several days   F/U: Thoughts of suicide or self-harm  No No   F/U: Safety concerns  No No         9/10/2020     1:21 PM 5/7/2021     1:44 PM 2/19/2022     8:16 PM   ZITA-7 SCORE   Total  Score   14 (moderate anxiety)   Total Score 2 1 14         5/2/2023     2:13 PM   Last PHQ-9   1.  Little interest or pleasure in doing things 1   2.  Feeling down, depressed, or hopeless 1   3.  Trouble falling or staying asleep, or sleeping too much 2   4.  Feeling tired or having little energy 2   5.  Poor appetite or overeating 0   6.  Feeling bad about yourself 1   7.  Trouble concentrating 2   8.  Moving slowly or restless 0   Q9: Thoughts of better off dead/self-harm past 2 weeks 1   PHQ-9 Total Score 10   In the past two weeks have you had thoughts of suicide or self harm? No   Do you have concerns about your personal safety or the safety of others? No         2/19/2022     8:16 PM   ZITA-7    1. Feeling nervous, anxious, or on edge 2   2. Not being able to stop or control worrying 1   3. Worrying too much about different things 2   4. Trouble relaxing 2   5. Being so restless that it is hard to sit still 3   6. Becoming easily annoyed or irritable 2   7. Feeling afraid, as if something awful might happen 2   ZITA-7 Total Score 14            View : No data to display.                Follow Up Actions Taken  Patient declined referral.     Discussed the following ways the patient can remain in a safe environment:  be around others  Suicide Assessment Five-step Evaluation and Treatment (SAFE-T)       Today's PHQ-2 Score:       5/2/2023     2:15 PM   PHQ-2 ( 1999 [employer])   Q1: Little interest or pleasure in doing things 2   Q2: Feeling down, depressed or hopeless 1   PHQ-2 Score 3   Q1: Little interest or pleasure in doing things More than half the days   Q2: Feeling down, depressed or hopeless Several days   PHQ-2 Score 3       Have you ever done Advance Care Planning? (For example, a Health Directive, POLST, or a discussion with a medical provider or your loved ones about your wishes): Yes, advance care planning is on file.    Social History     Tobacco Use     Smoking status: Former     Packs/day: 0.00      Types: Cigarettes     Smokeless tobacco: Never   Vaping Use     Vaping status: Former   Substance Use Topics     Alcohol use: No     Alcohol/week: 0.0 standard drinks of alcohol             5/2/2023     2:15 PM   Alcohol Use   Prescreen: >3 drinks/day or >7 drinks/week? No          View : No data to display.              Reviewed orders with patient.  Reviewed health maintenance and updated orders accordingly - Yes  Lab work is in process  Labs reviewed in EPIC  BP Readings from Last 3 Encounters:   05/02/23 104/64   04/03/23 126/70   11/08/21 110/62    Wt Readings from Last 3 Encounters:   05/02/23 60.1 kg (132 lb 7 oz)   04/03/23 60.8 kg (134 lb)   11/08/21 62.5 kg (137 lb 11.2 oz)                  Patient Active Problem List   Diagnosis     Cannabis dependence (H)     Moderate major depression (H)     ZITA (generalized anxiety disorder)     Encounter for initial prescription of contraceptive pills     No past surgical history on file.    Social History     Tobacco Use     Smoking status: Former     Packs/day: 0.00     Types: Cigarettes     Smokeless tobacco: Never   Vaping Use     Vaping status: Former   Substance Use Topics     Alcohol use: No     Alcohol/week: 0.0 standard drinks of alcohol     Family History   Problem Relation Age of Onset     Unknown/Adopted Father      Depression Father      Anxiety Disorder Father      Anxiety Disorder Brother      Attention Deficit Disorder Brother          Current Outpatient Medications   Medication Sig Dispense Refill     FLUoxetine (PROZAC) 20 MG capsule Take 1 capsule (20 mg) by mouth daily 90 capsule 3     hydrocortisone (WESTCORT) 0.2 % cream Apply topically 2 times daily Apply to affected area 45 g 2     fluticasone (FLONASE) 50 MCG/ACT nasal spray Spray 1 spray into both nostrils daily 15.8 mL 3     Allergies   Allergen Reactions     Penicillins Hives     No lab results found.     Breast Cancer Screening:        History of abnormal Pap smear:   NO - age 21-29 PAP  "every 3 years recommended  Last 3 Pap Results:   PAP (no units)   Date Value   11/05/2020 NIL         11/5/2020     8:47 AM   PAP / HPV   PAP (Historical) NIL       Reviewed and updated as needed this visit by clinical staff   Tobacco  Allergies  Meds              Reviewed and updated as needed this visit by Provider                   Past Medical History:   Diagnosis Date     Febrile convulsions (simple), unspecified       No past surgical history on file.  OB History   No obstetric history on file.       Review of Systems   Constitutional: Negative for chills and fever.   HENT: Negative for congestion, ear pain, hearing loss and sore throat.    Eyes: Negative for pain and visual disturbance.   Respiratory: Negative for cough and shortness of breath.    Cardiovascular: Negative for chest pain, palpitations and peripheral edema.   Gastrointestinal: Negative for abdominal pain, constipation, diarrhea, heartburn, hematochezia and nausea.   Breasts:  Negative for tenderness, breast mass and discharge.   Genitourinary: Negative for dysuria, frequency, genital sores, hematuria, pelvic pain, urgency, vaginal bleeding and vaginal discharge.   Musculoskeletal: Negative for arthralgias, joint swelling and myalgias.   Skin: Negative for rash.   Neurological: Negative for dizziness, weakness, headaches and paresthesias.   Psychiatric/Behavioral: Negative for mood changes. The patient is not nervous/anxious.            OBJECTIVE:   /64 (BP Location: Left arm, Patient Position: Sitting, Cuff Size: Adult Regular)   Pulse 102   Temp 97.5  F (36.4  C) (Tympanic)   Resp 18   Ht 1.569 m (5' 1.77\")   Wt 60.1 kg (132 lb 7 oz)   LMP 04/26/2023 (Exact Date)   SpO2 98%   BMI 24.40 kg/m    Physical Exam  GENERAL: healthy, alert and no distress  EYES: Eyes grossly normal to inspection, PERRL and conjunctivae and sclerae normal  HENT: ear canals and TM's normal, nose and mouth without ulcers or lesions  NECK: no adenopathy, " no asymmetry, masses, or scars and thyroid normal to palpation  RESP: lungs clear to auscultation - no rales, rhonchi or wheezes  CV: regular rate and rhythm, normal S1 S2, no S3 or S4, no murmur, click or rub, no peripheral edema and peripheral pulses strong  ABDOMEN: soft, nontender, no hepatosplenomegaly, no masses and bowel sounds normal  MS: no gross musculoskeletal defects noted, no edema  SKIN: no suspicious lesions or rashes  NEURO: Normal strength and tone, mentation intact and speech normal  PSYCH: mentation appears normal, affect normal/bright    Diagnostic Test Results:  Labs reviewed in Epic    ASSESSMENT/PLAN:   (Z00.00) Routine history and physical examination of adult  (primary encounter diagnosis)  Comment:    Plan: REVIEW OF HEALTH MAINTENANCE PROTOCOL ORDERS             (F32.1) Moderate major depression (H)  Comment:    Plan: FLUoxetine (PROZAC) 20 MG capsule          Worsening - added Fluoxetine.  Follow-up in 1-2 months.    (F12.20) Cannabis dependence (H)  Comment:    Plan:  Discussed cutting back, cessation    (F41.1) ZITA (generalized anxiety disorder)  Comment:    Plan: FLUoxetine (PROZAC) 20 MG capsule          See AVS.       (Z11.3) Screening for STDs (sexually transmitted diseases)  Comment:    Plan: NEISSERIA GONORRHOEA PCR, CHLAMYDIA TRACHOMATIS        PCR             (Z30.011) Encounter for initial prescription of contraceptive pills  Comment:    Plan:      Patient has been advised of split billing requirements and indicates understanding: Yes      COUNSELING:  Reviewed preventive health counseling, as reflected in patient instructions       Regular exercise       Healthy diet/nutrition        She reports that she has quit smoking. Her smoking use included cigarettes. She has never used smokeless tobacco.          Kavya Mckeon NP  [facility]  Answers for HPI/ROS submitted by the patient on 5/2/2023  If you checked off any problems, how difficult have these  problems made it for you to do your work, take care of things at home, or get along with other people?: Very difficult  PHQ9 TOTAL SCORE: 10

## 2023-05-02 NOTE — PATIENT INSTRUCTIONS
Possible side effects of antidepressants/anxiety meds, including but not limited to GI upset, disrupted sleep, loss of libido, worsening of mood or even possible risk of increased suicidal thoughts.   Often some of these things if not severe will improve after 1-2 weeks on medications but some may not see effects for 3-4 weeks,  if tolerable patients should continue meds and see if there is improvement.  If symptoms are intolerable or for any suicidal thoughts the medication should be stopped immediately and contact the clinic.      These medications should be used for 6-9 months before stopping, to avoid rebound symptoms.   Contact the clinic if having any problems tolerating these medications.  Take the medication daily and do not stop the medication abruptly.    Follow up in one month to discuss improvement and whether or not we need to change meds or increase dose.  Follow up sooner if problems.      Please plan to contact the clinic or 24 hour nurse line at any time if you are having any thoughts of self harm.    PRIMITIVO Hackett

## 2023-05-22 ENCOUNTER — MYC MEDICAL ADVICE (OUTPATIENT)
Dept: FAMILY MEDICINE | Facility: CLINIC | Age: 23
End: 2023-05-22
Payer: COMMERCIAL

## 2023-05-22 DIAGNOSIS — L30.9 ECZEMA, UNSPECIFIED TYPE: ICD-10-CM

## 2023-05-23 RX ORDER — HYDROCORTISONE VALERATE CREAM 2 MG/G
CREAM TOPICAL 2 TIMES DAILY
Qty: 45 G | Refills: 2 | Status: SHIPPED | OUTPATIENT
Start: 2023-05-23 | End: 2023-12-19

## 2023-05-23 NOTE — TELEPHONE ENCOUNTER
Kavya,    Patient is asking for her hydrocortisone cream to be refilled.  Pended. Flavia HANNAH RN

## 2023-12-19 ENCOUNTER — MYC REFILL (OUTPATIENT)
Dept: FAMILY MEDICINE | Facility: CLINIC | Age: 23
End: 2023-12-19
Payer: COMMERCIAL

## 2023-12-19 DIAGNOSIS — F41.1 GAD (GENERALIZED ANXIETY DISORDER): ICD-10-CM

## 2023-12-19 DIAGNOSIS — F32.1 MODERATE MAJOR DEPRESSION (H): ICD-10-CM

## 2023-12-19 DIAGNOSIS — L30.9 ECZEMA, UNSPECIFIED TYPE: ICD-10-CM

## 2023-12-20 NOTE — TELEPHONE ENCOUNTER
Pending Prescriptions:                       Disp   Refills    FLUoxetine (PROZAC) 20 MG capsule         90 cap*3            Sig: Take 1 capsule (20 mg) by mouth daily    hydrocortisone (WESTCORT) 0.2 % external *45 g   2            Sig: Apply topically 2 times daily Apply to affected           area    Routing refill request to provider for review/approval because:  PHQ-9 score:        5/2/2023     2:13 PM   PHQ   PHQ-9 Total Score 10   Q9: Thoughts of better off dead/self-harm past 2 weeks Several days   F/U: Thoughts of suicide or self-harm No   F/U: Safety concerns No            Faheem Lee RN

## 2023-12-21 RX ORDER — HYDROCORTISONE VALERATE CREAM 2 MG/G
CREAM TOPICAL 2 TIMES DAILY
Qty: 45 G | Refills: 1 | Status: SHIPPED | OUTPATIENT
Start: 2023-12-21 | End: 2024-02-28

## 2024-02-28 ENCOUNTER — OFFICE VISIT (OUTPATIENT)
Dept: FAMILY MEDICINE | Facility: CLINIC | Age: 24
End: 2024-02-28
Payer: COMMERCIAL

## 2024-02-28 VITALS
BODY MASS INDEX: 24.48 KG/M2 | SYSTOLIC BLOOD PRESSURE: 102 MMHG | WEIGHT: 133 LBS | DIASTOLIC BLOOD PRESSURE: 74 MMHG | RESPIRATION RATE: 16 BRPM | TEMPERATURE: 97 F | HEART RATE: 75 BPM | HEIGHT: 62 IN | OXYGEN SATURATION: 98 %

## 2024-02-28 DIAGNOSIS — F32.1 MODERATE MAJOR DEPRESSION (H): ICD-10-CM

## 2024-02-28 DIAGNOSIS — J30.81 ALLERGIC RHINITIS DUE TO ANIMALS: ICD-10-CM

## 2024-02-28 DIAGNOSIS — L50.9 HIVES: Primary | ICD-10-CM

## 2024-02-28 DIAGNOSIS — L30.9 ECZEMA, UNSPECIFIED TYPE: ICD-10-CM

## 2024-02-28 DIAGNOSIS — F12.20 CANNABIS DEPENDENCE (H): ICD-10-CM

## 2024-02-28 DIAGNOSIS — F41.1 GAD (GENERALIZED ANXIETY DISORDER): ICD-10-CM

## 2024-02-28 PROCEDURE — 99214 OFFICE O/P EST MOD 30 MIN: CPT | Performed by: NURSE PRACTITIONER

## 2024-02-28 RX ORDER — FLUTICASONE PROPIONATE 50 MCG
1 SPRAY, SUSPENSION (ML) NASAL DAILY
Qty: 15.8 ML | Refills: 2 | Status: SHIPPED | OUTPATIENT
Start: 2024-02-28

## 2024-02-28 RX ORDER — CETIRIZINE HYDROCHLORIDE 10 MG/1
10 TABLET ORAL DAILY
Qty: 30 TABLET | Refills: 3 | Status: SHIPPED | OUTPATIENT
Start: 2024-02-28

## 2024-02-28 RX ORDER — HYDROCORTISONE VALERATE CREAM 2 MG/G
CREAM TOPICAL 2 TIMES DAILY
Qty: 45 G | Refills: 1 | Status: SHIPPED | OUTPATIENT
Start: 2024-02-28

## 2024-02-28 ASSESSMENT — PATIENT HEALTH QUESTIONNAIRE - PHQ9
SUM OF ALL RESPONSES TO PHQ QUESTIONS 1-9: 9
10. IF YOU CHECKED OFF ANY PROBLEMS, HOW DIFFICULT HAVE THESE PROBLEMS MADE IT FOR YOU TO DO YOUR WORK, TAKE CARE OF THINGS AT HOME, OR GET ALONG WITH OTHER PEOPLE: SOMEWHAT DIFFICULT
SUM OF ALL RESPONSES TO PHQ QUESTIONS 1-9: 9

## 2024-02-28 ASSESSMENT — PAIN SCALES - GENERAL: PAINLEVEL: NO PAIN (0)

## 2024-02-28 NOTE — PROGRESS NOTES
Assessment & Plan     Hives  Discussed treatment, prevention.  Also has eczema - which has been getting worse.  Referral for allergy testing with specialist. Will start anti histamine and steroid nasal spray for symptoms management.    - cetirizine (ZYRTEC) 10 MG tablet  Dispense: 30 tablet; Refill: 3  - fluticasone (FLONASE) 50 MCG/ACT nasal spray  Dispense: 15.8 mL; Refill: 2  - Adult Allergy/Asthma  Referral    Allergic rhinitis due to animals     - cetirizine (ZYRTEC) 10 MG tablet  Dispense: 30 tablet; Refill: 3  - fluticasone (FLONASE) 50 MCG/ACT nasal spray  Dispense: 15.8 mL; Refill: 2  - Adult Allergy/Asthma  Referral    Cannabis dependence (H)       Moderate major depression (H)  Worsening - stopped medication - forgetting to take.  Restart and refilled today.  Reviewed PHQ-9 and ZITA - recheck in 4 weeks - can do virtual.    - FLUoxetine (PROZAC) 20 MG capsule  Dispense: 90 capsule; Refill: 1    ZITA (generalized anxiety disorder)     - FLUoxetine (PROZAC) 20 MG capsule  Dispense: 90 capsule; Refill: 1    Eczema, unspecified type     - hydrocortisone (WESTCORT) 0.2 % external cream  Dispense: 45 g; Refill: 1  - Adult Allergy/Asthma  Referral           See Patient Instructions    Wang Ivey is a 23 year old, presenting for the following health issues:  Hives        2/28/2024     7:51 AM   Additional Questions   Roomed by Paulo DIAZ CMA   Accompanied by self     History of Present Illness       Reason for visit:  Allergies    She eats 0-1 servings of fruits and vegetables daily.She consumes 1 sweetened beverage(s) daily.She exercises with enough effort to increase her heart rate 10 to 19 minutes per day.  She exercises with enough effort to increase her heart rate 3 or less days per week. She is missing 7 dose(s) of medications per week.         Rash  Onset/Duration: started when she got her dog in June of 2023  Description  Location: all over body   Character: blotchy,  raised, red  Itching: severe  Intensity:  severe  Progression of Symptoms:  worsening  Accompanying signs and symptoms:   Fever: No  Body aches or joint pain: No  Sore throat symptoms: No  Recent cold symptoms: No  History:           Previous episodes of similar rash: None  New exposures: pets  Recent travel: No  Exposure to similar rash: No  Precipitating or alleviating factors: otc allergy meds made it more blotchy   Therapies tried and outcome: see above     Depression and Anxiety Follow-Up  How are you doing with your depression since your last visit? Worsened due to stopping anti depressant   How are you doing with your anxiety since your last visit?  No change  Are you having other symptoms that might be associated with depression or anxiety? No  Have you had a significant life event? No   Do you have any concerns with your use of alcohol or other drugs? No    Social History     Tobacco Use    Smoking status: Former     Packs/day: 0     Types: Cigarettes    Smokeless tobacco: Never   Vaping Use    Vaping Use: Former   Substance Use Topics    Alcohol use: No     Alcohol/week: 0.0 standard drinks of alcohol    Drug use: Yes     Types: Marijuana     Comment: 2-3 times per month         2/19/2022     8:16 PM 5/2/2023     2:13 PM 2/28/2024     7:47 AM   PHQ   PHQ-9 Total Score 14 10 9   Q9: Thoughts of better off dead/self-harm past 2 weeks Several days Several days Not at all   F/U: Thoughts of suicide or self-harm No No    F/U: Safety concerns No No          9/10/2020     1:21 PM 5/7/2021     1:44 PM 2/19/2022     8:16 PM   ZITA-7 SCORE   Total Score   14 (moderate anxiety)   Total Score 2 1 14         2/28/2024     7:47 AM   Last PHQ-9   1.  Little interest or pleasure in doing things 2   2.  Feeling down, depressed, or hopeless 2   3.  Trouble falling or staying asleep, or sleeping too much 1   4.  Feeling tired or having little energy 1   5.  Poor appetite or overeating 0   6.  Feeling bad about yourself 1   7.  " Trouble concentrating 1   8.  Moving slowly or restless 1   Q9: Thoughts of better off dead/self-harm past 2 weeks 0   PHQ-9 Total Score 9         2/19/2022     8:16 PM   ZITA-7    1. Feeling nervous, anxious, or on edge 2   2. Not being able to stop or control worrying 1   3. Worrying too much about different things 2   4. Trouble relaxing 2   5. Being so restless that it is hard to sit still 3   6. Becoming easily annoyed or irritable 2   7. Feeling afraid, as if something awful might happen 2   ZITA-7 Total Score 14       Suicide Assessment Five-step Evaluation and Treatment (SAFE-T)     How many servings of fruits and vegetables do you eat daily?  2-3  On average, how many sweetened beverages do you drink each day (Examples: soda, juice, sweet tea, etc.  Do NOT count diet or artificially sweetened beverages)?   0  How many days per week do you exercise enough to make your heart beat faster? 3 or less  How many minutes a day do you exercise enough to make your heart beat faster? 9 or less  How many days per week do you miss taking your medication? Not taking Fluoxetine  What makes it hard for you to take your medications?  remembering to take       Review of Systems  Constitutional, neuro, ENT, endocrine, pulmonary, cardiac, gastrointestinal, genitourinary, musculoskeletal, integument and psychiatric systems are negative, except as otherwise noted.      Objective    /74 (BP Location: Right arm, Patient Position: Sitting, Cuff Size: Adult Regular)   Pulse 75   Temp 97  F (36.1  C) (Tympanic)   Resp 16   Ht 1.568 m (5' 1.75\")   Wt 60.3 kg (133 lb)   LMP 02/24/2024 (Exact Date)   SpO2 98%   BMI 24.52 kg/m    Body mass index is 24.52 kg/m .  Physical Exam   GENERAL: alert and no distress  RESP: lungs clear to auscultation - no rales, rhonchi or wheezes  CV: regular rate and rhythm, normal S1 S2, no S3 or S4, no murmur, click or rub, no peripheral edema   MS: no gross musculoskeletal defects noted, no " edema  SKIN: no suspicious lesions or rashes  PSYCH: mentation appears normal, affect normal/bright             Signed Electronically by: Kavya Mckeon DNP

## 2024-02-28 NOTE — PATIENT INSTRUCTIONS
Take Zyrtec one tablet daily.  May take benadryl at bedtime if needed  May use Sarna lotion for relief of hives.  Allergy clinic appointment recommended.  Return to ER immediately if you develop throat tightness, tongue swelling or difficulty breathing.      Referral to Allergy/Asthma - they will call you to schedule.    Kavya Mckeon, DNP

## 2024-03-26 ENCOUNTER — MYC REFILL (OUTPATIENT)
Dept: FAMILY MEDICINE | Facility: CLINIC | Age: 24
End: 2024-03-26
Payer: COMMERCIAL

## 2024-03-26 DIAGNOSIS — J30.81 ALLERGIC RHINITIS DUE TO ANIMALS: ICD-10-CM

## 2024-03-26 DIAGNOSIS — L50.9 HIVES: ICD-10-CM

## 2024-03-27 RX ORDER — CETIRIZINE HYDROCHLORIDE 10 MG/1
10 TABLET ORAL DAILY
Qty: 30 TABLET | Refills: 3 | OUTPATIENT
Start: 2024-03-27

## 2024-04-02 ENCOUNTER — PATIENT OUTREACH (OUTPATIENT)
Dept: CARE COORDINATION | Facility: CLINIC | Age: 24
End: 2024-04-02
Payer: COMMERCIAL

## 2024-04-16 ENCOUNTER — PATIENT OUTREACH (OUTPATIENT)
Dept: CARE COORDINATION | Facility: CLINIC | Age: 24
End: 2024-04-16
Payer: COMMERCIAL

## 2024-04-22 ENCOUNTER — MYC MEDICAL ADVICE (OUTPATIENT)
Dept: FAMILY MEDICINE | Facility: CLINIC | Age: 24
End: 2024-04-22
Payer: COMMERCIAL

## 2024-05-06 ENCOUNTER — MYC MEDICAL ADVICE (OUTPATIENT)
Dept: FAMILY MEDICINE | Facility: CLINIC | Age: 24
End: 2024-05-06
Payer: COMMERCIAL

## 2024-05-06 DIAGNOSIS — L50.9 HIVES: Primary | ICD-10-CM

## 2024-05-10 ENCOUNTER — ANCILLARY PROCEDURE (OUTPATIENT)
Dept: GENERAL RADIOLOGY | Facility: CLINIC | Age: 24
End: 2024-05-10
Attending: ALLERGY & IMMUNOLOGY
Payer: COMMERCIAL

## 2024-05-10 ENCOUNTER — OFFICE VISIT (OUTPATIENT)
Dept: ALLERGY | Facility: CLINIC | Age: 24
End: 2024-05-10
Attending: NURSE PRACTITIONER
Payer: COMMERCIAL

## 2024-05-10 VITALS
BODY MASS INDEX: 25.59 KG/M2 | TEMPERATURE: 97.5 F | DIASTOLIC BLOOD PRESSURE: 72 MMHG | OXYGEN SATURATION: 98 % | WEIGHT: 138.8 LBS | SYSTOLIC BLOOD PRESSURE: 106 MMHG | HEART RATE: 91 BPM

## 2024-05-10 DIAGNOSIS — H10.89 OTHER CONJUNCTIVITIS OF BOTH EYES: ICD-10-CM

## 2024-05-10 DIAGNOSIS — R06.2 WHEEZING: Primary | ICD-10-CM

## 2024-05-10 DIAGNOSIS — J31.0 CHRONIC RHINITIS: ICD-10-CM

## 2024-05-10 DIAGNOSIS — L50.9 URTICARIA: ICD-10-CM

## 2024-05-10 DIAGNOSIS — R06.2 WHEEZING: ICD-10-CM

## 2024-05-10 LAB
ALBUMIN SERPL BCG-MCNC: 4.5 G/DL (ref 3.5–5.2)
ALP SERPL-CCNC: 78 U/L (ref 40–150)
ALT SERPL W P-5'-P-CCNC: 11 U/L (ref 0–50)
ANION GAP SERPL CALCULATED.3IONS-SCNC: 11 MMOL/L (ref 7–15)
AST SERPL W P-5'-P-CCNC: 21 U/L (ref 0–45)
BASOPHILS # BLD AUTO: 0 10E3/UL (ref 0–0.2)
BASOPHILS NFR BLD AUTO: 1 %
BILIRUB SERPL-MCNC: 0.3 MG/DL
BUN SERPL-MCNC: 7.2 MG/DL (ref 6–20)
CALCIUM SERPL-MCNC: 9.3 MG/DL (ref 8.6–10)
CHLORIDE SERPL-SCNC: 105 MMOL/L (ref 98–107)
CREAT SERPL-MCNC: 0.61 MG/DL (ref 0.51–0.95)
DEPRECATED HCO3 PLAS-SCNC: 25 MMOL/L (ref 22–29)
EGFRCR SERPLBLD CKD-EPI 2021: >90 ML/MIN/1.73M2
EOSINOPHIL # BLD AUTO: 0.5 10E3/UL (ref 0–0.7)
EOSINOPHIL NFR BLD AUTO: 7 %
ERYTHROCYTE [DISTWIDTH] IN BLOOD BY AUTOMATED COUNT: 12.6 % (ref 10–15)
GLUCOSE SERPL-MCNC: 134 MG/DL (ref 70–99)
HCT VFR BLD AUTO: 39.6 % (ref 35–47)
HGB BLD-MCNC: 12.6 G/DL (ref 11.7–15.7)
IMM GRANULOCYTES # BLD: 0 10E3/UL
IMM GRANULOCYTES NFR BLD: 0 %
LYMPHOCYTES # BLD AUTO: 1.9 10E3/UL (ref 0.8–5.3)
LYMPHOCYTES NFR BLD AUTO: 23 %
MCH RBC QN AUTO: 29.8 PG (ref 26.5–33)
MCHC RBC AUTO-ENTMCNC: 31.8 G/DL (ref 31.5–36.5)
MCV RBC AUTO: 94 FL (ref 78–100)
MONOCYTES # BLD AUTO: 0.4 10E3/UL (ref 0–1.3)
MONOCYTES NFR BLD AUTO: 5 %
NEUTROPHILS # BLD AUTO: 5.3 10E3/UL (ref 1.6–8.3)
NEUTROPHILS NFR BLD AUTO: 64 %
PLATELET # BLD AUTO: 260 10E3/UL (ref 150–450)
POTASSIUM SERPL-SCNC: 3.5 MMOL/L (ref 3.4–5.3)
PROT SERPL-MCNC: 7.7 G/DL (ref 6.4–8.3)
RBC # BLD AUTO: 4.23 10E6/UL (ref 3.8–5.2)
SODIUM SERPL-SCNC: 141 MMOL/L (ref 135–145)
T4 FREE SERPL-MCNC: 1.18 NG/DL (ref 0.9–1.7)
TSH SERPL DL<=0.005 MIU/L-ACNC: 0.62 UIU/ML (ref 0.3–4.2)
WBC # BLD AUTO: 8.2 10E3/UL (ref 4–11)

## 2024-05-10 PROCEDURE — 94640 AIRWAY INHALATION TREATMENT: CPT | Performed by: ALLERGY & IMMUNOLOGY

## 2024-05-10 PROCEDURE — 80053 COMPREHEN METABOLIC PANEL: CPT | Performed by: ALLERGY & IMMUNOLOGY

## 2024-05-10 PROCEDURE — 82785 ASSAY OF IGE: CPT | Performed by: ALLERGY & IMMUNOLOGY

## 2024-05-10 PROCEDURE — 36415 COLL VENOUS BLD VENIPUNCTURE: CPT | Performed by: ALLERGY & IMMUNOLOGY

## 2024-05-10 PROCEDURE — 86003 ALLG SPEC IGE CRUDE XTRC EA: CPT | Mod: 90 | Performed by: ALLERGY & IMMUNOLOGY

## 2024-05-10 PROCEDURE — 86800 THYROGLOBULIN ANTIBODY: CPT | Performed by: ALLERGY & IMMUNOLOGY

## 2024-05-10 PROCEDURE — 71046 X-RAY EXAM CHEST 2 VIEWS: CPT | Mod: TC | Performed by: RADIOLOGY

## 2024-05-10 PROCEDURE — 99000 SPECIMEN HANDLING OFFICE-LAB: CPT | Performed by: ALLERGY & IMMUNOLOGY

## 2024-05-10 PROCEDURE — 85025 COMPLETE CBC W/AUTO DIFF WBC: CPT | Performed by: ALLERGY & IMMUNOLOGY

## 2024-05-10 PROCEDURE — 84439 ASSAY OF FREE THYROXINE: CPT | Performed by: ALLERGY & IMMUNOLOGY

## 2024-05-10 PROCEDURE — 86376 MICROSOMAL ANTIBODY EACH: CPT | Performed by: ALLERGY & IMMUNOLOGY

## 2024-05-10 PROCEDURE — 99204 OFFICE O/P NEW MOD 45 MIN: CPT | Mod: 25 | Performed by: ALLERGY & IMMUNOLOGY

## 2024-05-10 PROCEDURE — 84443 ASSAY THYROID STIM HORMONE: CPT | Performed by: ALLERGY & IMMUNOLOGY

## 2024-05-10 PROCEDURE — 86003 ALLG SPEC IGE CRUDE XTRC EA: CPT | Performed by: ALLERGY & IMMUNOLOGY

## 2024-05-10 RX ORDER — AZELASTINE 1 MG/ML
2 SPRAY, METERED NASAL 2 TIMES DAILY PRN
Qty: 30 ML | Refills: 3 | Status: SHIPPED | OUTPATIENT
Start: 2024-05-10

## 2024-05-10 RX ORDER — PREDNISONE 20 MG/1
40 TABLET ORAL DAILY
Qty: 10 TABLET | Refills: 0 | Status: SHIPPED | OUTPATIENT
Start: 2024-05-10 | End: 2024-05-15

## 2024-05-10 RX ORDER — BUDESONIDE AND FORMOTEROL FUMARATE DIHYDRATE 80; 4.5 UG/1; UG/1
2 AEROSOL RESPIRATORY (INHALATION) EVERY 4 HOURS PRN
Qty: 10.2 G | Refills: 0 | Status: SHIPPED | OUTPATIENT
Start: 2024-05-10

## 2024-05-10 RX ORDER — ALBUTEROL SULFATE 0.83 MG/ML
2.5 SOLUTION RESPIRATORY (INHALATION) ONCE
Status: COMPLETED | OUTPATIENT
Start: 2024-05-10 | End: 2024-05-10

## 2024-05-10 RX ADMIN — ALBUTEROL SULFATE 2.5 MG: 0.83 SOLUTION RESPIRATORY (INHALATION) at 14:17

## 2024-05-10 NOTE — LETTER
"    5/10/2024         RE: Loni Neumann  [address] [facility] [id number]        Dear Colleague,    Thank you for referring your patient, Loni Neumann, to the [facility]. Please see a copy of my visit note below.    SUBJECTIVE:                                                                   Loni Neumann is a 24-year-old female who presents today to our Allergy Clinic at [facility]; She is being seen in consultation at the request of Kavya Mckeon DNP, for an evaluation of urticaria.      History of Present Illness  The patient has been having hives since August 2023.  She got herself a dog in May 2023.  She attributes hives due to the dog.  On the other hand, she may develop hives when she is outdoors and not exposed to the dog.  The hives are manifested by erythematous, pruritic, raised, sometimes flat, small skin lesions.  Then not persistent in the same spot for more than 24 hours.  They do not leave any marks or bruising's.  Not associated with recurrent fevers, joint pain, muscle pain, throat closing sensation, lip swelling, or tongue swelling.  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  She has been taking cetirizine 10 mg by mouth once daily.  It improves pruritus, but not the hives.  She has a history of Spring allergies, manifested by nasal itch, rhinorrhea, nasal congestion, sneezing, and itchy eyes.  Usually, she manages symptoms with cetirizine and intranasal fluticasone.  This year, symptoms started in August, at the same time when she started having hives.    Currently, she is seeing with a \"Cold and cough \", symptoms which are also present at that school.  She has not tried using albuterol inhaler for her current symptoms.  Denies having any issues with stinging episodes.           Patient Active Problem List   Diagnosis     Cannabis " dependence (H)     Moderate major depression (H)     ZITA (generalized anxiety disorder)     Encounter for initial prescription of contraceptive pills     Allergic rhinitis due to animals     Hives       Past Medical History:   Diagnosis Date     Febrile convulsions (simple), unspecified       Problem (# of Occurrences) Relation (Name,Age of Onset)    Anxiety Disorder (2) Father, Brother    Unknown/Adopted (1) Father    Depression (1) Father    Attention Deficit Disorder (1) Brother          History reviewed. No pertinent surgical history.  Social History     Socioeconomic History     Marital status: Single     Spouse name: None     Number of children: None     Years of education: None     Highest education level: None   Tobacco Use     Smoking status: Former     Types: Cigarettes     Smokeless tobacco: Never   Vaping Use     Vaping status: Former   Substance and Sexual Activity     Alcohol use: No     Alcohol/week: 0.0 standard drinks of alcohol     Drug use: Yes     Types: Marijuana     Comment: 2-3 times per month     Sexual activity: Yes     Partners: Male     Birth control/protection: Injection   Social History Narrative    05/10/24        ENVIRONMENTAL HISTORY: [place] The home is heated with a electric furnace. They does have central air conditioning. The patient's bedroom is furnished with carpeting in bedroom and fabric window coverings.  Pets inside the house include 2 cat(s) and 1 dog(s). There no history of cockroach or mice infestation. There is/are 1 smokers (vaper) in the house.  The house does not have a damp basement.      Social Determinants of Health     Interpersonal Safety: Low Risk  (2/28/2024)    Interpersonal Safety      Do you feel physically and emotionally safe where you currently live?: Yes      Within the past 12 months, have you been hit, slapped, kicked or otherwise physically hurt by someone?: No      Within the past 12 months, have you  been humiliated or emotionally abused in other ways by your partner or ex-partner?: No               Current Outpatient Medications:      azelastine (ASTELIN) 0.1 % nasal spray, Spray 2 sprays into both nostrils 2 times daily as needed for rhinitis, Disp: 30 mL, Rfl: 3     budesonide-formoterol (SYMBICORT) 80-4.5 MCG/ACT Inhaler, Inhale 2 puffs into the lungs every 4 hours as needed (Wheezing, chest tightness, shortness of breath, or persistent cough), Disp: 10.2 g, Rfl: 0     cetirizine (ZYRTEC) 10 MG tablet, Take 1 tablet (10 mg) by mouth daily, Disp: 30 tablet, Rfl: 3     FLUoxetine (PROZAC) 20 MG capsule, Take 1 capsule (20 mg) by mouth daily, Disp: 90 capsule, Rfl: 1     fluticasone (FLONASE) 50 MCG/ACT nasal spray, Spray 1 spray into both nostrils daily, Disp: 15.8 mL, Rfl: 2     hydrocortisone (WESTCORT) 0.2 % external cream, Apply topically 2 times daily Apply to affected area, Disp: 45 g, Rfl: 1     predniSONE (DELTASONE) 20 MG tablet, Take 2 tablets (40 mg) by mouth daily for 5 days, Disp: 10 tablet, Rfl: 0  Immunization History   Administered Date(s) Administered     Comvax (HIB/HepB) 1999     DTAP (<7y) 1999, 1999, 1999, 04/29/2004     HEPA 09/10/2007, 08/13/2012     HIB (PRP-T) 1999, 1999, 1999     HPV 08/13/2012, 10/18/2012, 08/27/2015     HPV Quadrivalent 10/18/2012     HepB 1999, 03/23/2000     Influenza Intranasal Vaccine 10/20/2008, 10/18/2012     Influenza Vaccine >6 months,quad, PF 10/22/2020     MMR 08/30/2000, 04/29/2004     Meningococcal ACWY (Menactra ) 08/13/2012, 08/27/2015     Poliovirus, inactivated (IPV) 1999, 1999, 08/30/2000, 04/29/2004     TDAP Vaccine (Adacel) 08/13/2012     TRIHIBIT (DTAP/HIB, <7y) 08/30/2000     Varicella 08/30/2000, 08/27/2015     Allergies   Allergen Reactions     Penicillins Hives     OBJECTIVE:                                                                 /72 (BP Location: Left arm, Patient  Position: Sitting, Cuff Size: Adult Regular)   Pulse 91   Temp 97.5  F (36.4  C) (Tympanic)   Wt 63 kg (138 lb 12.8 oz)   SpO2 98%   BMI 25.59 kg/m      Examination chaperoned by Saurabh Rodrigez MA.      Physical Exam  Vitals and nursing note reviewed.   Constitutional:       General: She is not in acute distress.     Appearance: She is not ill-appearing, toxic-appearing or diaphoretic.   HENT:      Head: Normocephalic and atraumatic.      Right Ear: Tympanic membrane, ear canal and external ear normal.      Left Ear: Tympanic membrane, ear canal and external ear normal.      Nose: No mucosal edema, congestion or rhinorrhea.      Right Turbinates: Not enlarged, swollen or pale.      Left Turbinates: Not enlarged, swollen or pale.      Mouth/Throat:      Lips: Pink.      Mouth: Mucous membranes are moist.      Pharynx: Oropharynx is clear. No pharyngeal swelling, oropharyngeal exudate, posterior oropharyngeal erythema or uvula swelling.   Eyes:      General:         Right eye: No discharge.         Left eye: No discharge.      Conjunctiva/sclera: Conjunctivae normal.   Cardiovascular:      Rate and Rhythm: Normal rate and regular rhythm.      Heart sounds: Normal heart sounds.   Pulmonary:      Effort: Pulmonary effort is normal. No respiratory distress.      Breath sounds: Normal air entry. No stridor, decreased air movement or transmitted upper airway sounds. Wheezing (Diffuse, bilaterally) present. No decreased breath sounds, rhonchi or rales.   Musculoskeletal:         General: Normal range of motion.   Skin:     Findings: Rash present.      Comments: Multiple, 3 to 4 mm, raised, erythematous, circular skin lesions that are consistent with urticaria.   Neurological:      Mental Status: She is alert and oriented to person, place, and time.   Psychiatric:         Mood and Affect: Mood normal.         Behavior: Behavior normal.           ASSESSMENT/PLAN:    Assessment & Plan  1.  Urticaria  Chronic idiopathic  urticaria versus environmental aeroallergen exposure.     -Ordered CBC with differential, serum IgE for the regional aeroallergen panel, comprehensive metabolic panel, thyroid studies, including thyroid antibody levels, urticaria induced basophil activation, and IgE to alpha gal.  -Start cetirizine 10 mg by mouth twice daily.  Depending on symptom control, she will gradually increase the dose up to 20 mg by mouth twice daily.      - IgE  - Allergen cat epithellium IgE  - Allergen dog epithelium IgE  - Allergen Chema grass IgE  - Allergen orchard grass IgE  - Allergen jericho IgE  - Allergen D farinae IgE  - Allergen D pteronyssinus IgE  - Allergen alternaria alternata IgE  - Allergen aspergillus fumigatus IgE  - Allergen cladosporium herbarum IgE  - Allergen Epicoccum purpurascens IgE  - Allergen penicillium notatum IgE  - Allergen elijah white IgE  - Allergen Cedar IgE  - Allergen cottonwood IgE  - Allergen elm IgE  - Allergen maple box elder IgE  - Allergen oak white IgE  - Allergen Red Eden IgE  - Allergen silver  birch IgE  - Allergen Tree White Eden IgE  - Allergen Bison Tree  - Allergen white pine IgE  - Allergen English plantain IgE  - Allergen giant ragweed IgE  - Allergen lamb's quarter IgE  - Allergen Mugwort IgE  - Allergen ragweed short IgE  - Allergen Sagebrush Wormwood IgE  - Allergen Sheep Sorrel IgE  - Allergen thistle Russian IgE  - Allergen Weed Nettle IgE  - Allergen, Kochia/Firebush  - Urticaria Induced Basophil Activation  - CBC with Platelets & Differential  - Comprehensive metabolic panel  - Anti thyroglobulin antibody  - Thyroid peroxidase antibody  - TSH  - T4 free  - Galactose alpha 1 3 Galactose IgE    2.  Chronic rhinoconjunctivitis.  Symptoms are fairly well-controlled with intranasal fluticasone and cetirizine.  -She will continue with intranasal fluticasone 1-2 sprays in each nostril once daily.  -Add azelastine 2 sprays in each nostril twice daily as needed.  - She will  increase the dose of cetirizine to control her urticaria.      - azelastine (ASTELIN) 0.1 % nasal spray  Dispense: 30 mL; Refill: 3      3. Wheezing.  Likely asthma.    - Ordered chest x-ray, 2 views.  Wheezing did not resolve but remarkably improved with 1 albuterol neb treatment.  - Start prednisone 40 mg by mouth once daily for 5 days.  - Use Symbicort 80/4.5 mcg 2 puffs twice daily plus 1-2 puffs every 4 hours as needed, maximum 12 puffs/day.  Once she is better, she can transition Symbicort to every 4 hours as needed.    - budesonide-formoterol (SYMBICORT) 80-4.5 MCG/ACT Inhaler  Dispense: 10.2 g; Refill: 0  - predniSONE (DELTASONE) 20 MG tablet  Dispense: 10 tablet; Refill: 0  - XR Chest 2 Views      Follow-up in 2 months, or sooner if necessary.       Thank you for allowing us to participate in the care of this patient. Please feel free to contact us if there are any questions or concerns about the patient.    Disclaimer: This note consists of symbols derived from keyboarding, dictation and/or voice recognition software. As a result, there may be errors in the script that have gone undetected. Please consider this when interpreting information found in this chart.    Jaime Martinez MD, FAAAAI, FACAAI  Allergy and Asthma     [facility]        Again, thank you for allowing me to participate in the care of your patient.        Sincerely,        Jaime Martinez MD

## 2024-05-10 NOTE — PROGRESS NOTES
"SUBJECTIVE:                                                                   Loni Neumann is a 24-year-old female who presents today to our Allergy Clinic at [facility]; She is being seen in consultation at the request of Kavya Mckeon DNP, for an evaluation of urticaria.      History of Present Illness  The patient has been having hives since August 2023.  She got herself a dog in May 2023.  She attributes hives due to the dog.  On the other hand, she may develop hives when she is outdoors and not exposed to the dog.  The hives are manifested by erythematous, pruritic, raised, sometimes flat, small skin lesions.  Then not persistent in the same spot for more than 24 hours.  They do not leave any marks or bruising's.  Not associated with recurrent fevers, joint pain, muscle pain, throat closing sensation, lip swelling, or tongue swelling.  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  She has been taking cetirizine 10 mg by mouth once daily.  It improves pruritus, but not the hives.  She has a history of Spring allergies, manifested by nasal itch, rhinorrhea, nasal congestion, sneezing, and itchy eyes.  Usually, she manages symptoms with cetirizine and intranasal fluticasone.  This year, symptoms started in August, at the same time when she started having hives.    Currently, she is seeing with a \"Cold and cough \", symptoms which are also present at that school.  She has not tried using albuterol inhaler for her current symptoms.  Denies having any issues with stinging episodes.           Patient Active Problem List   Diagnosis    Cannabis dependence (H)    Moderate major depression (H)    ZITA (generalized anxiety disorder)    Encounter for initial prescription of contraceptive pills    Allergic rhinitis due to animals    Hives       Past Medical History:   Diagnosis Date    Febrile " convulsions (simple), unspecified       Problem (# of Occurrences) Relation (Name,Age of Onset)    Anxiety Disorder (2) Father, Brother    Unknown/Adopted (1) Father    Depression (1) Father    Attention Deficit Disorder (1) Brother          History reviewed. No pertinent surgical history.  Social History     Socioeconomic History    Marital status: Single     Spouse name: None    Number of children: None    Years of education: None    Highest education level: None   Tobacco Use    Smoking status: Former     Types: Cigarettes    Smokeless tobacco: Never   Vaping Use    Vaping status: Former   Substance and Sexual Activity    Alcohol use: No     Alcohol/week: 0.0 standard drinks of alcohol    Drug use: Yes     Types: Marijuana     Comment: 2-3 times per month    Sexual activity: Yes     Partners: Male     Birth control/protection: Injection   Social History Narrative    05/10/24        ENVIRONMENTAL HISTORY: [place] The home is heated with a electric furnace. They does have central air conditioning. The patient's bedroom is furnished with carpeting in bedroom and fabric window coverings.  Pets inside the house include 2 cat(s) and 1 dog(s). There no history of cockroach or mice infestation. There is/are 1 smokers (vaper) in the house.  The house does not have a damp basement.      Social Determinants of Health     Interpersonal Safety: Low Risk  (2/28/2024)    Interpersonal Safety     Do you feel physically and emotionally safe where you currently live?: Yes     Within the past 12 months, have you been hit, slapped, kicked or otherwise physically hurt by someone?: No     Within the past 12 months, have you been humiliated or emotionally abused in other ways by your partner or ex-partner?: No               Current Outpatient Medications:     azelastine (ASTELIN) 0.1 % nasal spray, Spray 2 sprays into both nostrils 2 times daily as needed for rhinitis, Disp: 30 mL, Rfl:  3    budesonide-formoterol (SYMBICORT) 80-4.5 MCG/ACT Inhaler, Inhale 2 puffs into the lungs every 4 hours as needed (Wheezing, chest tightness, shortness of breath, or persistent cough), Disp: 10.2 g, Rfl: 0    cetirizine (ZYRTEC) 10 MG tablet, Take 1 tablet (10 mg) by mouth daily, Disp: 30 tablet, Rfl: 3    FLUoxetine (PROZAC) 20 MG capsule, Take 1 capsule (20 mg) by mouth daily, Disp: 90 capsule, Rfl: 1    fluticasone (FLONASE) 50 MCG/ACT nasal spray, Spray 1 spray into both nostrils daily, Disp: 15.8 mL, Rfl: 2    hydrocortisone (WESTCORT) 0.2 % external cream, Apply topically 2 times daily Apply to affected area, Disp: 45 g, Rfl: 1    predniSONE (DELTASONE) 20 MG tablet, Take 2 tablets (40 mg) by mouth daily for 5 days, Disp: 10 tablet, Rfl: 0  Immunization History   Administered Date(s) Administered    Comvax (HIB/HepB) 1999    DTAP (<7y) 1999, 1999, 1999, 04/29/2004    HEPA 09/10/2007, 08/13/2012    HIB (PRP-T) 1999, 1999, 1999    HPV 08/13/2012, 10/18/2012, 08/27/2015    HPV Quadrivalent 10/18/2012    HepB 1999, 03/23/2000    Influenza Intranasal Vaccine 10/20/2008, 10/18/2012    Influenza Vaccine >6 months,quad, PF 10/22/2020    MMR 08/30/2000, 04/29/2004    Meningococcal ACWY (Menactra ) 08/13/2012, 08/27/2015    Poliovirus, inactivated (IPV) 1999, 1999, 08/30/2000, 04/29/2004    TDAP Vaccine (Adacel) 08/13/2012    TRIHIBIT (DTAP/HIB, <7y) 08/30/2000    Varicella 08/30/2000, 08/27/2015     Allergies   Allergen Reactions    Penicillins Hives     OBJECTIVE:                                                                 /72 (BP Location: Left arm, Patient Position: Sitting, Cuff Size: Adult Regular)   Pulse 91   Temp 97.5  F (36.4  C) (Tympanic)   Wt 63 kg (138 lb 12.8 oz)   SpO2 98%   BMI 25.59 kg/m      Examination chaperoned by Saurabh Rodrigez MA.      Physical Exam  Vitals and nursing note reviewed.   Constitutional:       General: She  is not in acute distress.     Appearance: She is not ill-appearing, toxic-appearing or diaphoretic.   HENT:      Head: Normocephalic and atraumatic.      Right Ear: Tympanic membrane, ear canal and external ear normal.      Left Ear: Tympanic membrane, ear canal and external ear normal.      Nose: No mucosal edema, congestion or rhinorrhea.      Right Turbinates: Not enlarged, swollen or pale.      Left Turbinates: Not enlarged, swollen or pale.      Mouth/Throat:      Lips: Pink.      Mouth: Mucous membranes are moist.      Pharynx: Oropharynx is clear. No pharyngeal swelling, oropharyngeal exudate, posterior oropharyngeal erythema or uvula swelling.   Eyes:      General:         Right eye: No discharge.         Left eye: No discharge.      Conjunctiva/sclera: Conjunctivae normal.   Cardiovascular:      Rate and Rhythm: Normal rate and regular rhythm.      Heart sounds: Normal heart sounds.   Pulmonary:      Effort: Pulmonary effort is normal. No respiratory distress.      Breath sounds: Normal air entry. No stridor, decreased air movement or transmitted upper airway sounds. Wheezing (Diffuse, bilaterally) present. No decreased breath sounds, rhonchi or rales.   Musculoskeletal:         General: Normal range of motion.   Skin:     Findings: Rash present.      Comments: Multiple, 3 to 4 mm, raised, erythematous, circular skin lesions that are consistent with urticaria.   Neurological:      Mental Status: She is alert and oriented to person, place, and time.   Psychiatric:         Mood and Affect: Mood normal.         Behavior: Behavior normal.           ASSESSMENT/PLAN:    Assessment & Plan  1.  Urticaria  Chronic idiopathic urticaria versus environmental aeroallergen exposure.     -Ordered CBC with differential, serum IgE for the regional aeroallergen panel, comprehensive metabolic panel, thyroid studies, including thyroid antibody levels, urticaria induced basophil activation, and IgE to alpha gal.  -Start  cetirizine 10 mg by mouth twice daily.  Depending on symptom control, she will gradually increase the dose up to 20 mg by mouth twice daily.      - IgE  - Allergen cat epithellium IgE  - Allergen dog epithelium IgE  - Allergen Chema grass IgE  - Allergen orchard grass IgE  - Allergen jericho IgE  - Allergen D farinae IgE  - Allergen D pteronyssinus IgE  - Allergen alternaria alternata IgE  - Allergen aspergillus fumigatus IgE  - Allergen cladosporium herbarum IgE  - Allergen Epicoccum purpurascens IgE  - Allergen penicillium notatum IgE  - Allergen elijah white IgE  - Allergen Cedar IgE  - Allergen cottonwood IgE  - Allergen elm IgE  - Allergen maple box elder IgE  - Allergen oak white IgE  - Allergen Red Panacea IgE  - Allergen silver  birch IgE  - Allergen Tree White Panacea IgE  - Allergen [place] Tree  - Allergen white pine IgE  - Allergen English plantain IgE  - Allergen giant ragweed IgE  - Allergen lamb's quarter IgE  - Allergen Mugwort IgE  - Allergen ragweed short IgE  - Allergen Sagebrush Wormwood IgE  - Allergen Sheep Sorrel IgE  - Allergen thistle Russian IgE  - Allergen Weed Nettle IgE  - Allergen, Kochia/Firebush  - Urticaria Induced Basophil Activation  - CBC with Platelets & Differential  - Comprehensive metabolic panel  - Anti thyroglobulin antibody  - Thyroid peroxidase antibody  - TSH  - T4 free  - Galactose alpha 1 3 Galactose IgE    2.  Chronic rhinoconjunctivitis.  Symptoms are fairly well-controlled with intranasal fluticasone and cetirizine.  -She will continue with intranasal fluticasone 1-2 sprays in each nostril once daily.  -Add azelastine 2 sprays in each nostril twice daily as needed.  - She will increase the dose of cetirizine to control her urticaria.      - azelastine (ASTELIN) 0.1 % nasal spray  Dispense: 30 mL; Refill: 3      3. Wheezing.  Likely asthma.    - Ordered chest x-ray, 2 views.  Wheezing did not resolve but remarkably improved with 1 albuterol neb treatment.  - Start  prednisone 40 mg by mouth once daily for 5 days.  - Use Symbicort 80/4.5 mcg 2 puffs twice daily plus 1-2 puffs every 4 hours as needed, maximum 12 puffs/day.  Once she is better, she can transition Symbicort to every 4 hours as needed.    - budesonide-formoterol (SYMBICORT) 80-4.5 MCG/ACT Inhaler  Dispense: 10.2 g; Refill: 0  - predniSONE (DELTASONE) 20 MG tablet  Dispense: 10 tablet; Refill: 0  - XR Chest 2 Views      Follow-up in 2 months, or sooner if necessary.       Thank you for allowing us to participate in the care of this patient. Please feel free to contact us if there are any questions or concerns about the patient.    Disclaimer: This note consists of symbols derived from keyboarding, dictation and/or voice recognition software. As a result, there may be errors in the script that have gone undetected. Please consider this when interpreting information found in this chart.  Consent was obtained from the patient to use an AI documentation tool in the creation of this note.       Jaime Martinez MD, FAAAAI, FACAAI  Allergy and Asthma     [facility]

## 2024-05-10 NOTE — NURSING NOTE
The following nebulizer treatment was given:     MEDICATION: Albuterol Sulfate 2.5 mg  : ritedose  LOT #: [id number]  EXPIRATION DATE:  1/31/2025  NDC # 64055-123-6     Nebulizer Start Time:  1:50pm  Nebulizer Stop Time:  2:02pm  See Vital Signs Flowsheet     Saurabh Rodrigez MA

## 2024-05-10 NOTE — PATIENT INSTRUCTIONS
Use Symbicort 80/4.5 mcg 2 puffs twice daily plus 1-2 puffs every 4 hours as needed, maximum 12 puffs/day.   Once you are better, you can stop twice daily use, and transition to 1-2 puffs every 4 hours as needed.     --Start prednisone 40 mg by mouth once a day for 5 days.        -continue Flonase 1-2 sprays in each nostril once daily.  -Start azelastine nasal spray, 2 sprays in each nostril twice daily as needed.     Start cetirizine 10 mg by mouth twice daily, but ok to gradually increase up to 20 mg twice daily, depending the hives control.    Follow up in 2 months or sooner if needed.         Prescription Assistance  If you need assistance with your prescriptions (cost, coverage, etc) please contact: [place] [facility] [phone]           If labs have been ordered/completed, please allow 7-14 business days for final interpretation of results to be sent on My Chart, phone or mail. Some lab results can take up to 28 days for results.         Allergy Staff Appt Hours Shot Hours Locations    Physician      Jaime Martinez MD         Support Staff      Angelica Wiley MA     Tuesday:   [place] :  [place]: :         :  [place] 7-3     [place]        Tuesday: 7- 4:20        Wednesday: 7-4:20      : 7-4:10        Tuesday: 7-4:10        Thursday: 7-3:10      & Wed: :10       Thurs: 12-4:10       Fri:             [facility]  [address] [place] [address]  Appt Line: [phone]        [facility]  [address] [place] [address]  Appt Line: [phone]     Pulmonary Function Scheduling:  [place]: [phone]  [place]: [phone]  [phone]

## 2024-05-12 LAB
ALPHA-GAL IGE QN: <0.1 KU/L
DEPRECATED MISC ALLERGEN IGE RAST QL: NORMAL

## 2024-05-12 NOTE — RESULT ENCOUNTER NOTE
Anapsist message sent:   chest x-ray:  No acute cardiopulmonary changes.  -No changes in the plan.

## 2024-05-13 LAB
A ALTERNATA IGE QN: <0.1 KU(A)/L
A FUMIGATUS IGE QN: <0.1 KU(A)/L
C HERBARUM IGE QN: <0.1 KU(A)/L
CALIF WALNUT POLN IGE QN: <0.1 KU(A)/L
CAT DANDER IGG QN: 25.7 KU(A)/L
CEDAR IGE QN: <0.1 KU(A)/L
COCKSFOOT IGE QN: <0.1 KU(A)/L
COMMON RAGWEED IGE QN: <0.1 KU(A)/L
COTTONWOOD IGE QN: <0.1 KU(A)/L
D FARINAE IGE QN: <0.1 KU(A)/L
D PTERONYSS IGE QN: <0.1 KU(A)/L
DOG DANDER+EPITH IGE QN: 5.31 KU(A)/L
E PURPURASCENS IGE QN: <0.1 KU(A)/L
EAST WHITE PINE IGE QN: <0.1 KU(A)/L
ENGL PLANTAIN IGE QN: <0.1 KU(A)/L
FIREBUSH IGE QN: <0.1 KU(A)/L
GIANT RAGWEED IGE QN: <0.1 KU(A)/L
GOOSEFOOT IGE QN: <0.1 KU(A)/L
IGE SERPL-ACNC: 299 KU/L (ref 0–114)
JOHNSON GRASS IGE QN: <0.1 KU(A)/L
MAPLE IGE QN: <0.1 KU(A)/L
MUGWORT IGE QN: <0.1 KU(A)/L
NETTLE IGE QN: <0.1 KU(A)/L
P NOTATUM IGE QN: <0.1 KU(A)/L
RED MULBERRY IGE QN: <0.1 KU(A)/L
SALTWORT IGE QN: <0.1 KU(A)/L
SHEEP SORREL IGE QN: <0.1 KU(A)/L
SILVER BIRCH IGE QN: 0.14 KU(A)/L
THYROGLOB AB SERPL IA-ACNC: <20 IU/ML
THYROPEROXIDASE AB SERPL-ACNC: 13 IU/ML
TIMOTHY IGE QN: <0.1 KU(A)/L
WHITE ASH IGE QN: <0.1 KU(A)/L
WHITE ELM IGE QN: <0.1 KU(A)/L
WHITE MULBERRY IGE QN: <0.1 KU(A)/L
WHITE OAK IGE QN: <0.1 KU(A)/L
WORMWOOD IGE QN: <0.1 KU(A)/L

## 2024-05-16 ENCOUNTER — LAB (OUTPATIENT)
Dept: LAB | Facility: CLINIC | Age: 24
End: 2024-05-16
Payer: COMMERCIAL

## 2024-05-16 DIAGNOSIS — L50.9 URTICARIA: ICD-10-CM

## 2024-05-16 PROCEDURE — 99000 SPECIMEN HANDLING OFFICE-LAB: CPT

## 2024-05-16 PROCEDURE — 88185 FLOWCYTOMETRY/TC ADD-ON: CPT | Mod: 90

## 2024-05-16 PROCEDURE — 36415 COLL VENOUS BLD VENIPUNCTURE: CPT

## 2024-05-16 PROCEDURE — 88184 FLOWCYTOMETRY/ TC 1 MARKER: CPT | Mod: 90

## 2024-05-21 ENCOUNTER — TELEPHONE (OUTPATIENT)
Dept: FAMILY MEDICINE | Facility: CLINIC | Age: 24
End: 2024-05-21
Payer: COMMERCIAL

## 2024-05-21 NOTE — TELEPHONE ENCOUNTER
Patient Quality Outreach    Patient is due for the following:   Cervical Cancer Screening - PAP Needed  Physical Preventive Adult Physical    Next Steps:   Schedule a Adult Preventative    Type of outreach:    Sent [employer] message.      Questions for provider review:    None           Paulo Whitfield, CMA

## 2024-05-23 LAB — URTICARIA INDUCED BASOPHIL ACTIVATION: 0 %

## 2024-05-27 NOTE — RESULT ENCOUNTER NOTE
[place] message sent:   CBC with differential is within normal limits.  Absolute eosinophil count 500.  Comprehensive metabolic panel is essentially within normal limits.  Elevated total serum IgE, which is not uncommon for the patients with allergic rhinitis, asthma, food allergies, and/or eczema.  Serum IgE for the Regional Aeroallergen Panel with sensitivity to catand dog.  Slight sensitivity to birch tree pollen.  -Pay attention to nasal symptoms and hives with exposure to pets.  Do hives happened with exposure to pets only?  Thyroid studies including thyroid antibody levels within normal limits.  Negative serum IgE for alpha gal, suggesting that the red meat is not a culprit.  Urticaria induced basophil activation is within normal limits.  -- No changes in the previously discussed treatment plan with high dose of cetirizine.

## 2024-06-16 ENCOUNTER — HEALTH MAINTENANCE LETTER (OUTPATIENT)
Age: 24
End: 2024-06-16

## 2024-09-07 DIAGNOSIS — F41.1 GAD (GENERALIZED ANXIETY DISORDER): ICD-10-CM

## 2024-09-07 DIAGNOSIS — F32.1 MODERATE MAJOR DEPRESSION (H): ICD-10-CM

## 2024-10-16 ENCOUNTER — TELEPHONE (OUTPATIENT)
Dept: FAMILY MEDICINE | Facility: CLINIC | Age: 24
End: 2024-10-16
Payer: COMMERCIAL

## 2024-10-16 NOTE — TELEPHONE ENCOUNTER
Patient Quality Outreach    Patient is due for the following:   Cervical Cancer Screening - PAP Needed  Physical Preventive Adult Physical    Next Steps:   Schedule a Adult Preventative    Type of outreach:    Sent [employer] message.      Questions for provider review:    None           Paulo Whitfield, CMA

## 2024-10-29 ENCOUNTER — PATIENT OUTREACH (OUTPATIENT)
Dept: CARE COORDINATION | Facility: CLINIC | Age: 24
End: 2024-10-29
Payer: COMMERCIAL